# Patient Record
Sex: FEMALE | Race: WHITE | NOT HISPANIC OR LATINO | URBAN - NONMETROPOLITAN AREA
[De-identification: names, ages, dates, MRNs, and addresses within clinical notes are randomized per-mention and may not be internally consistent; named-entity substitution may affect disease eponyms.]

---
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
## 2021-04-23 ENCOUNTER — OFFICE VISIT (OUTPATIENT)
Dept: CARDIOLOGY | Facility: CLINIC | Age: 21
End: 2021-04-23

## 2021-04-23 VITALS
TEMPERATURE: 98.5 F | BODY MASS INDEX: 27.64 KG/M2 | HEART RATE: 73 BPM | WEIGHT: 146.4 LBS | SYSTOLIC BLOOD PRESSURE: 98 MMHG | DIASTOLIC BLOOD PRESSURE: 68 MMHG | HEIGHT: 61 IN

## 2021-04-23 DIAGNOSIS — R06.02 SHORTNESS OF BREATH: ICD-10-CM

## 2021-04-23 DIAGNOSIS — M79.605 PAIN IN BOTH LOWER EXTREMITIES: ICD-10-CM

## 2021-04-23 DIAGNOSIS — R01.1 HEART MURMUR: ICD-10-CM

## 2021-04-23 DIAGNOSIS — R00.2 PALPITATIONS: ICD-10-CM

## 2021-04-23 DIAGNOSIS — M79.604 PAIN IN BOTH LOWER EXTREMITIES: ICD-10-CM

## 2021-04-23 DIAGNOSIS — R60.0 EDEMA LEG: Primary | ICD-10-CM

## 2021-04-23 PROBLEM — I95.9 HYPOTENSION: Status: ACTIVE | Noted: 2021-04-23

## 2021-04-23 PROCEDURE — 93000 ELECTROCARDIOGRAM COMPLETE: CPT | Performed by: NURSE PRACTITIONER

## 2021-04-23 PROCEDURE — 99204 OFFICE O/P NEW MOD 45 MIN: CPT | Performed by: NURSE PRACTITIONER

## 2021-04-23 RX ORDER — PRENATAL VIT NO.126/IRON/FOLIC 28MG-0.8MG
TABLET ORAL DAILY
COMMUNITY

## 2021-04-23 RX ORDER — FLUOXETINE 10 MG/1
10 CAPSULE ORAL DAILY
COMMUNITY

## 2021-04-23 NOTE — PROGRESS NOTES
Subjective   Ivett Patrick is a 20 y.o. female     Chief Complaint   Patient presents with   • [facility]     Patient referred per Jac Webster for Hypotension and near syncope.   • Blood pressure     Noted with low B/P when she was 12 weeks pregnant 94/60, she was lightheaded, dizzy, felt hot, and having nausea. She is now 17 weeks pregnant has had 2 episodes of low B/P 96/64. Has noticed episodes more at work, feels could be related to not being able to eat adequately while at work, unable to snack.   • Edema     Having swelling in feet for the last couple of days. She is on feet a lot during the day.   • Rapid Heart Rate     At times she feels elevated heart rate, will check rate with pulse ox and notice 126. Reports normal rate for her is 60s.   • Shortness of Breath     Notices when doing laundry and dishes at home.     Initial cardiac evaluation;    HPI     Ms. Ivett Patrick is a [age] year old female who presents today for initial cardiac evaluation of hypotension and near syncope. She has no significant past medical history and no cardiac history. She is seventeen weeks pregnant with her first child. She has experienced palpitations on and off for many years described as an occasional skip or fluttering sensation not associated with chest pain, dizziness, or syncope. After she was about 12 weeks pregnant, she started noticing the palpitations occurring more frequently. She started monitoring the blood pressure and noted she was often mildly hypotensive, 94/60 and experienced light-headedness with this. She does not correlate palpitations with dizziness. She also has noticed increasing dyspnea and tachycardia with exertion. 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 According to her, OB visits have revealed no hypertension, no proteinuria. After having the low blood pressure, she increased sodium intake through  Gatorade or Slim Jims. For the last two weeks, she has had increasing generalized edema, hands and feet feel swollen. Legs are aching especially after working all day. She has gained 6-7 pounds from prepregnancy weight. She is a non-tobacco user. Drinks 1-2 caffeinated beverages daily. She has occasional tension-type headache. She has history of mild anxiety and depression treated with SSRI. Recently was started back on fluoxetine.  Labs done with OB on 3/27/21: TSH 0.672, normal CBC, H/H 12.4/37.3, platelets 208, glucose 86, BUN/Cr 8/0.47, Na/K 136/3.9, normal LFT, magnesium 2.0.     Cardiac History  Past Surgical History:   Procedure Laterality Date   • NO PAST SURGERIES       Current Outpatient Medications   Medication Sig Dispense Refill   • FLUoxetine (PROzac) 10 MG capsule Take 10 mg by mouth Daily.     • prenatal vitamin (prenatal, CLASSIC, vitamin) tablet Take  by mouth Daily.       No current facility-administered medications for this visit.     Amoxicillin and Penicillins    Past Medical History:   Diagnosis Date   • Anxiety    • Depression    • Healthy adolescent    • Hx of tonsillectomy 2008     Social History     Socioeconomic History   • Marital status: Single     Spouse name: Not on file   • Number of children: Not on file   • Years of education: Not on file   • Highest education level: Not on file   Tobacco Use   • Smoking status: Never Smoker   • Smokeless tobacco: Never Used   Vaping Use   • Vaping Use: Never used   Substance and Sexual Activity   • Alcohol use: No   • Drug use: Never   • Sexual activity: Never     Family History   Problem Relation Age of Onset   • No Known Problems Father    • Hypotension Mother    • Heart murmur Sister    • Hypertension Maternal Grandmother    • Clotting disorder Maternal Grandmother    • Cancer Maternal Grandfather    • Hypertension Maternal Grandfather    • Diabetes Paternal Grandmother    • Heart attack Paternal Grandfather         3 open heart surgery   •  "Stroke Paternal Grandfather    • Lung disease Paternal Grandfather    • Hypertension Paternal Grandfather      Review of Systems   Constitutional: Positive for fatigue.   Eyes: Negative.    Respiratory: Positive for shortness of breath. Negative for cough and chest tightness.    Cardiovascular: Positive for palpitations and leg swelling. Negative for chest pain.   Gastrointestinal: Negative.    Endocrine: Negative.    Genitourinary: Negative.    Musculoskeletal: Positive for neck stiffness.   Skin: Negative.    Allergic/Immunologic: Negative.    Neurological: Positive for dizziness, light-headedness and headaches.   Hematological: Negative.    Psychiatric/Behavioral: Negative.    All other systems reviewed and are negative.    Diabetes- No  Thyroid- normal    Objective     BP 98/68 (BP Location: Left arm)   Pulse 73   Temp 98.5 °F (36.9 °C)   Ht 154.9 cm (61\")   Wt 66.4 kg (146 lb 6.4 oz)   BMI 27.66 kg/m²     Physical Exam  Vitals and nursing note reviewed.   Constitutional:       General: She is not in acute distress.     Appearance: Normal appearance. She is normal weight. She is not ill-appearing or diaphoretic.   HENT:      Head: Normocephalic and atraumatic.      Right Ear: External ear normal.      Left Ear: External ear normal.      Nose: Nose normal.      Mouth/Throat:      Mouth: Mucous membranes are moist.   Eyes:      General: No scleral icterus.     Extraocular Movements: Extraocular movements intact.      Pupils: Pupils are equal, round, and reactive to light.   Neck:      Vascular: No carotid bruit.   Cardiovascular:      Rate and Rhythm: Normal rate and regular rhythm.  No extrasystoles are present.     Pulses: Normal pulses.      Heart sounds: Murmur heard.   Systolic murmur is present with a grade of 2/6.     Pulmonary:      Effort: Pulmonary effort is normal. No respiratory distress.      Breath sounds: Normal breath sounds.   Abdominal:      General: There is no distension.      Comments: " Patient is 17 weeks pregnant    Musculoskeletal:         General: Swelling (trace edema to LE bilat, hands bilat) present. No tenderness.      Cervical back: Normal range of motion.      Right lower leg: Edema (trace ) present.      Left lower leg: Edema (trace ) present.   Skin:     General: Skin is warm and dry.      Capillary Refill: Capillary refill takes less than 2 seconds.      Coloration: Skin is not pale.   Neurological:      General: No focal deficit present.      Mental Status: She is oriented to person, place, and time. Mental status is at baseline.      Coordination: Coordination normal.   Psychiatric:         Mood and Affect: Mood normal.         Behavior: Behavior normal.         Thought Content: Thought content normal.         Judgment: Judgment normal.         ECG 12 Lead    Date/Time: 4/23/2021 9:51 AM  Performed by: Jadyn Austin APRN  Authorized by: Jadyn Austin APRN   Comparison: not compared with previous ECG   Previous ECG: no previous ECG available  Rhythm: sinus rhythm  Rate: normal  BPM: 73  ST Segments: ST segments normal    Clinical impression: normal ECG  Comments:  ms  QRS 82 ms  QTc 390 ms           Assessment/Plan     Diagnoses and all orders for this visit:    1. Edema leg (Primary)  -     Adult Transthoracic Echo Complete W/ Cont if Necessary Per Protocol; Future  -     BNP; Future  -     Sedimentation Rate; Future    2. Shortness of breath  -     Adult Transthoracic Echo Complete W/ Cont if Necessary Per Protocol; Future  -     BNP; Future  -     TSH; Future    3. Heart murmur  -     Adult Transthoracic Echo Complete W/ Cont if Necessary Per Protocol; Future    4. Pain in both lower extremities  -     Sedimentation Rate; Future    5. Palpitations  -     Sedimentation Rate; Future  -     TSH; Future  -     Holter Monitor - 72 Hour Up To 15 Days; Future       Cardiac exam reveals normal first and second heart sound, 1-2/6 systolic murmur noted at the mid right sternal border.  Lungs CTA. Trace peripheral edema noted, LE and hands bilaterally. EKG is normal.     Hypotension- blood pressure lower limit of normal at 98/68. Continue adequate fluid intake, water, electrolyte drinks are acceptable but need to limit daily sodium intake to around 3299-7865 mg. Regular protein/nutrition intake to prevent hypoglycemia.     Edema- Limit na intake. Heart healthy diet. Regular walking. Avoid prolong sitting or standing. Use of compression socks may be helpful. Will check BNP, sed rate. Recheck CMP, CBC, TSH.     Heart murmur- echocardiogram to evaluate mitral, aortic, and tricuspid valves, LVEF, PA pressure. May be flow murmur secondary to the pregnancy. Will order bubble study to rule out PFO with history of headache and dizziness.     Palpitations will be evaluated with 7 day holter monitor. She may need low dose labetalol if there is significant arrhythmia.     At this time, I did not start any medication. Will try to manage her conservatively. Further recommendations to follow. We will see her back in 3-4 months before she delivers. Reassurance was given.

## 2021-05-19 ENCOUNTER — LAB (OUTPATIENT)
Dept: LAB | Facility: HOSPITAL | Age: 21
End: 2021-05-19

## 2021-05-19 ENCOUNTER — HOSPITAL ENCOUNTER (OUTPATIENT)
Dept: CARDIOLOGY | Facility: HOSPITAL | Age: 21
Discharge: HOME | End: 2021-05-19

## 2021-05-19 VITALS — HEIGHT: 61 IN | BODY MASS INDEX: 27.64 KG/M2 | WEIGHT: 146.39 LBS

## 2021-05-19 DIAGNOSIS — M79.604 PAIN IN BOTH LOWER EXTREMITIES: ICD-10-CM

## 2021-05-19 DIAGNOSIS — R60.0 EDEMA LEG: ICD-10-CM

## 2021-05-19 DIAGNOSIS — M79.605 PAIN IN BOTH LOWER EXTREMITIES: ICD-10-CM

## 2021-05-19 DIAGNOSIS — R00.2 PALPITATIONS: ICD-10-CM

## 2021-05-19 DIAGNOSIS — R01.1 HEART MURMUR: ICD-10-CM

## 2021-05-19 DIAGNOSIS — R06.02 SHORTNESS OF BREATH: ICD-10-CM

## 2021-05-19 LAB
AORTIC DIMENSIONLESS INDEX: 0.9 (DI)
BH CV ECHO MEAS - AO MAX PG (FULL): 1.9 MMHG
BH CV ECHO MEAS - AO MAX PG: 8.5 MMHG
BH CV ECHO MEAS - AO MEAN PG (FULL): 1 MMHG
BH CV ECHO MEAS - AO MEAN PG: 4 MMHG
BH CV ECHO MEAS - AO V2 MAX: 146 CM/SEC
BH CV ECHO MEAS - AO V2 MEAN: 89.4 CM/SEC
BH CV ECHO MEAS - AO V2 VTI: 29.9 CM
BH CV ECHO MEAS - BSA(HAYCOCK): 1.7 M^2
BH CV ECHO MEAS - BSA: 1.7 M^2
BH CV ECHO MEAS - BZI_BMI: 27.6 KILOGRAMS/M^2
BH CV ECHO MEAS - BZI_METRIC_HEIGHT: 154.9 CM
BH CV ECHO MEAS - BZI_METRIC_WEIGHT: 66.2 KG
BH CV ECHO MEAS - EDV(CUBED): 95.4 ML
BH CV ECHO MEAS - EDV(TEICH): 95.9 ML
BH CV ECHO MEAS - EF(CUBED): 65.3 %
BH CV ECHO MEAS - EF(TEICH): 56.9 %
BH CV ECHO MEAS - ESV(CUBED): 33.1 ML
BH CV ECHO MEAS - ESV(TEICH): 41.3 ML
BH CV ECHO MEAS - FS: 29.8 %
BH CV ECHO MEAS - IVS/LVPW: 0.96
BH CV ECHO MEAS - IVSD: 0.74 CM
BH CV ECHO MEAS - LA DIMENSION: 3.2 CM
BH CV ECHO MEAS - LAT PEAK E' VEL: 19.6 CM/SEC
BH CV ECHO MEAS - LV IVRT: 0.09 SEC
BH CV ECHO MEAS - LV MASS(C)D: 108 GRAMS
BH CV ECHO MEAS - LV MASS(C)DI: 65.4 GRAMS/M^2
BH CV ECHO MEAS - LV MAX PG: 6.7 MMHG
BH CV ECHO MEAS - LV MEAN PG: 3 MMHG
BH CV ECHO MEAS - LV V1 MAX: 129 CM/SEC
BH CV ECHO MEAS - LV V1 MEAN: 80.4 CM/SEC
BH CV ECHO MEAS - LV V1 VTI: 25.8 CM
BH CV ECHO MEAS - LVIDD: 4.6 CM
BH CV ECHO MEAS - LVIDS: 3.2 CM
BH CV ECHO MEAS - LVPWD: 0.77 CM
BH CV ECHO MEAS - MED PEAK E' VEL: 12.5 CM/SEC
BH CV ECHO MEAS - MV A MAX VEL: 33.8 CM/SEC
BH CV ECHO MEAS - MV DEC SLOPE: 456 CM/SEC^2
BH CV ECHO MEAS - MV DEC TIME: 0.35 SEC
BH CV ECHO MEAS - MV E MAX VEL: 99.4 CM/SEC
BH CV ECHO MEAS - MV E/A: 2.9
BH CV ECHO MEAS - MV MAX PG: 6.3 MMHG
BH CV ECHO MEAS - MV MEAN PG: 2 MMHG
BH CV ECHO MEAS - MV P1/2T MAX VEL: 135 CM/SEC
BH CV ECHO MEAS - MV P1/2T: 86.7 MSEC
BH CV ECHO MEAS - MV V2 MAX: 125 CM/SEC
BH CV ECHO MEAS - MV V2 MEAN: 68 CM/SEC
BH CV ECHO MEAS - MV V2 VTI: 34.4 CM
BH CV ECHO MEAS - MVA P1/2T LCG: 1.6 CM^2
BH CV ECHO MEAS - MVA(P1/2T): 2.5 CM^2
BH CV ECHO MEAS - PA MAX PG (FULL): 1.6 MMHG
BH CV ECHO MEAS - PA MAX PG: 4.4 MMHG
BH CV ECHO MEAS - PA MEAN PG (FULL): 1.5 MMHG
BH CV ECHO MEAS - PA MEAN PG: 2.5 MMHG
BH CV ECHO MEAS - PA V2 MAX: 104.1 CM/SEC
BH CV ECHO MEAS - PA V2 MEAN: 67.3 CM/SEC
BH CV ECHO MEAS - PA V2 VTI: 24.1 CM
BH CV ECHO MEAS - RAP SYSTOLE: 10 MMHG
BH CV ECHO MEAS - RV MAX PG: 2.8 MMHG
BH CV ECHO MEAS - RV MEAN PG: 1 MMHG
BH CV ECHO MEAS - RV V1 MAX: 83.6 CM/SEC
BH CV ECHO MEAS - RV V1 MEAN: 52.1 CM/SEC
BH CV ECHO MEAS - RV V1 VTI: 20.2 CM
BH CV ECHO MEAS - RVDD: 2.3 CM
BH CV ECHO MEAS - RVSP: 18 MMHG
BH CV ECHO MEAS - SI(CUBED): 37.7 ML/M^2
BH CV ECHO MEAS - SI(TEICH): 33 ML/M^2
BH CV ECHO MEAS - SV(CUBED): 62.4 ML
BH CV ECHO MEAS - SV(TEICH): 54.6 ML
BH CV ECHO MEAS - TR MAX VEL: 133 CM/SEC
BH CV ECHO MEASUREMENTS AVERAGE E/E' RATIO: 6.19
ERYTHROCYTE [SEDIMENTATION RATE] IN BLOOD: 17 MM/HR (ref 0–20)
MAXIMAL PREDICTED HEART RATE: 200 BPM
NT-PROBNP SERPL-MCNC: 58.5 PG/ML (ref 0–450)
STRESS TARGET HR: 170 BPM
TSH SERPL DL<=0.05 MIU/L-ACNC: 0.75 UIU/ML (ref 0.27–4.2)

## 2021-05-19 PROCEDURE — 93306 TTE W/DOPPLER COMPLETE: CPT

## 2021-05-19 PROCEDURE — 36415 COLL VENOUS BLD VENIPUNCTURE: CPT

## 2021-05-19 PROCEDURE — 85652 RBC SED RATE AUTOMATED: CPT

## 2021-05-19 PROCEDURE — 83880 ASSAY OF NATRIURETIC PEPTIDE: CPT

## 2021-05-19 PROCEDURE — 84443 ASSAY THYROID STIM HORMONE: CPT

## 2021-05-19 PROCEDURE — 93306 TTE W/DOPPLER COMPLETE: CPT | Performed by: INTERNAL MEDICINE

## 2021-05-19 RX ORDER — SODIUM CHLORIDE 9 MG/ML
8 INJECTION INTRAMUSCULAR; INTRAVENOUS; SUBCUTANEOUS AS NEEDED
Status: DISCONTINUED | OUTPATIENT
Start: 2021-05-19 | End: 2021-05-20 | Stop reason: HOSPADM

## 2021-05-19 RX ADMIN — SODIUM CHLORIDE 8 ML: 9 INJECTION, SOLUTION INTRAMUSCULAR; INTRAVENOUS; SUBCUTANEOUS at 15:16

## 2023-11-08 ENCOUNTER — CLINICAL SUPPORT (OUTPATIENT)
Dept: FAMILY MEDICINE CLINIC | Facility: CLINIC | Age: 23
End: 2023-11-08
Payer: COMMERCIAL

## 2023-11-08 DIAGNOSIS — Z23 FLU VACCINE NEED: Primary | ICD-10-CM

## 2023-11-08 PROCEDURE — 90686 IIV4 VACC NO PRSV 0.5 ML IM: CPT | Performed by: FAMILY MEDICINE

## 2023-11-08 PROCEDURE — 90471 IMMUNIZATION ADMIN: CPT | Performed by: FAMILY MEDICINE

## 2023-11-28 ENCOUNTER — CLINICAL SUPPORT (OUTPATIENT)
Dept: FAMILY MEDICINE CLINIC | Facility: CLINIC | Age: 23
End: 2023-11-28
Payer: COMMERCIAL

## 2023-11-28 VITALS
HEART RATE: 100 BPM | DIASTOLIC BLOOD PRESSURE: 82 MMHG | SYSTOLIC BLOOD PRESSURE: 100 MMHG | TEMPERATURE: 97.9 F | OXYGEN SATURATION: 99 %

## 2023-11-28 DIAGNOSIS — R42 LIGHTHEADED: Primary | ICD-10-CM

## 2023-11-28 DIAGNOSIS — R11.0 NAUSEA: ICD-10-CM

## 2023-11-28 LAB — GLUCOSE BLDC GLUCOMTR-MCNC: 74 MG/DL (ref 70–130)

## 2023-11-28 PROCEDURE — 96372 THER/PROPH/DIAG INJ SC/IM: CPT | Performed by: FAMILY MEDICINE

## 2023-11-28 PROCEDURE — 82948 REAGENT STRIP/BLOOD GLUCOSE: CPT | Performed by: FAMILY MEDICINE

## 2023-11-28 RX ORDER — ONDANSETRON 2 MG/ML
4 INJECTION INTRAMUSCULAR; INTRAVENOUS EVERY 6 HOURS PRN
Status: DISCONTINUED | OUTPATIENT
Start: 2023-11-28 | End: 2023-11-28

## 2023-11-28 RX ORDER — ONDANSETRON 2 MG/ML
4 INJECTION INTRAMUSCULAR; INTRAVENOUS ONCE
Status: COMPLETED | OUTPATIENT
Start: 2023-11-28 | End: 2023-11-28

## 2023-11-28 RX ADMIN — ONDANSETRON 4 MG: 2 INJECTION INTRAMUSCULAR; INTRAVENOUS at 10:12

## 2023-12-29 DIAGNOSIS — K21.9 GASTROESOPHAGEAL REFLUX DISEASE WITHOUT ESOPHAGITIS: Primary | ICD-10-CM

## 2023-12-29 RX ORDER — PANTOPRAZOLE SODIUM 40 MG/1
40 TABLET, DELAYED RELEASE ORAL 2 TIMES DAILY
Qty: 60 TABLET | Refills: 0 | Status: SHIPPED | OUTPATIENT
Start: 2023-12-29

## 2024-05-13 ENCOUNTER — OFFICE VISIT (OUTPATIENT)
Age: 24
End: 2024-05-13
Payer: COMMERCIAL

## 2024-05-13 VITALS
SYSTOLIC BLOOD PRESSURE: 123 MMHG | OXYGEN SATURATION: 98 % | BODY MASS INDEX: 30.89 KG/M2 | HEIGHT: 61 IN | DIASTOLIC BLOOD PRESSURE: 81 MMHG | WEIGHT: 163.6 LBS | HEART RATE: 92 BPM

## 2024-05-13 DIAGNOSIS — F90.0 ADHD (ATTENTION DEFICIT HYPERACTIVITY DISORDER), INATTENTIVE TYPE: Primary | ICD-10-CM

## 2024-05-13 DIAGNOSIS — F41.1 GENERALIZED ANXIETY DISORDER: ICD-10-CM

## 2024-05-13 RX ORDER — DROSPIRENONE AND ESTETROL 3-14.2(28)
3-14.2 KIT ORAL DAILY
COMMUNITY

## 2024-05-13 RX ORDER — DEXTROAMPHETAMINE SACCHARATE, AMPHETAMINE ASPARTATE, DEXTROAMPHETAMINE SULFATE AND AMPHETAMINE SULFATE 2.5; 2.5; 2.5; 2.5 MG/1; MG/1; MG/1; MG/1
10 TABLET ORAL 2 TIMES DAILY
Qty: 60 TABLET | Refills: 0 | Status: SHIPPED | OUTPATIENT
Start: 2024-05-13
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
## 2024-05-13 NOTE — PATIENT INSTRUCTIONS
Www.[email or web address]    Book recommendations  Unf**k Your Brain, Lavern Pedersen  Try Softer, Ishmael Cole  No Bad Parts, Kvng Sutton  A Radical Guide for Women with ADHD  Adult Children of Emotionally Immature Parents    Britta recommendations:  I am: affirmations  Benson: free library access apps, including audiobooks  Down Dog: free yoga

## 2024-05-15 PROBLEM — F41.1 GENERALIZED ANXIETY DISORDER: Status: ACTIVE | Noted: 2024-05-15

## 2024-05-21 ENCOUNTER — PRIOR AUTHORIZATION (OUTPATIENT)
Dept: BEHAVIORAL HEALTH | Facility: CLINIC | Age: 24
End: 2024-05-21
Payer: COMMERCIAL

## 2024-05-31 ENCOUNTER — DOCUMENTATION (OUTPATIENT)
Dept: FAMILY MEDICINE CLINIC | Facility: CLINIC | Age: 24
End: 2024-05-31
Payer: COMMERCIAL

## 2024-05-31 DIAGNOSIS — Z30.8 ENCOUNTER FOR OTHER CONTRACEPTIVE MANAGEMENT: Primary | ICD-10-CM

## 2024-05-31 DIAGNOSIS — Z30.8 ENCOUNTER FOR OTHER CONTRACEPTIVE MANAGEMENT: ICD-10-CM

## 2024-05-31 RX ORDER — DROSPIRENONE AND ESTETROL 3-14.2(28)
1 KIT ORAL DAILY
Qty: 84 TABLET | Refills: 1 | Status: SHIPPED | OUTPATIENT
Start: 2024-05-31

## 2024-05-31 RX ORDER — DROSPIRENONE AND ESTETROL 3-14.2(28)
1 KIT ORAL DAILY
Qty: 28 TABLET | Refills: 5 | Status: SHIPPED | OUTPATIENT
Start: 2024-05-31 | End: 2024-05-31 | Stop reason: SDUPTHER

## 2024-05-31 NOTE — PROGRESS NOTES
Patient is currently taking Nextstellis and she is out of refills and her women's health provider has moved locations. She is tolerating the birth control well; she doesn't smoke cigarettes or vape. She is planning to establish care for women's health with Karli Crook PA-C some time within the next few weeks. Nextstellis has been sent to the patient's pharmacy to prevent pregnancy. I have discussed indications and possible SEs with patient.

## 2024-06-05 ENCOUNTER — PATIENT ROUNDING (BHMG ONLY) (OUTPATIENT)
Dept: FAMILY MEDICINE CLINIC | Facility: CLINIC | Age: 24
End: 2024-06-05
Payer: COMMERCIAL

## 2024-06-05 ENCOUNTER — OFFICE VISIT (OUTPATIENT)
Dept: FAMILY MEDICINE CLINIC | Facility: CLINIC | Age: 24
End: 2024-06-05
Payer: COMMERCIAL

## 2024-06-05 VITALS
WEIGHT: 163 LBS | HEART RATE: 91 BPM | HEIGHT: 61 IN | SYSTOLIC BLOOD PRESSURE: 118 MMHG | OXYGEN SATURATION: 99 % | TEMPERATURE: 98.4 F | BODY MASS INDEX: 30.78 KG/M2 | RESPIRATION RATE: 20 BRPM | DIASTOLIC BLOOD PRESSURE: 80 MMHG

## 2024-06-05 DIAGNOSIS — Z12.4 CERVICAL CANCER SCREENING: ICD-10-CM

## 2024-06-05 DIAGNOSIS — N92.1 MENORRHAGIA WITH IRREGULAR CYCLE: ICD-10-CM

## 2024-06-05 DIAGNOSIS — Z30.8 ENCOUNTER FOR OTHER CONTRACEPTIVE MANAGEMENT: Primary | ICD-10-CM

## 2024-06-05 DIAGNOSIS — Z00.00 ENCOUNTER FOR MEDICAL EXAMINATION TO ESTABLISH CARE: ICD-10-CM

## 2024-06-05 LAB
B-HCG UR QL: NEGATIVE
EXPIRATION DATE: NORMAL
INTERNAL NEGATIVE CONTROL: NEGATIVE
INTERNAL POSITIVE CONTROL: POSITIVE
Lab: NORMAL

## 2024-06-05 RX ORDER — DROSPIRENONE AND ESTETROL 3-14.2(28)
1 KIT ORAL DAILY
Qty: 84 TABLET | Refills: 3 | Status: SHIPPED | OUTPATIENT
Start: 2024-06-05

## 2024-06-05 NOTE — PROGRESS NOTES
Office Note     Name: Ivett Patrick    : 2000     MRN: [id number]     Chief Complaint  Establish Care    History of Present Illness:  Ivett Patrick is a 23 y.o. female who presents today for establishment of care. Previous PCP was Ana Maria Chilel and Dolly Barragan. She does see behavioral health for ADHD, anxiety, and depression.  She reports her ADHD and anxiety and depression symptoms are well-controlled on current medications.    She reports that her last Pap smear was 7 months ago.  She does report history of abnormal Paps and positive HPV in the past.  She reports she had a colposcopy over 1 year ago.  She is planning to establish with new gynecologist in the area soon.  She reports that she had wellness labs completed within the past year.    She is currently using Nextstellis for birth control, which she reports has been working very well.  She has been on this birth control for around 1 year.  She reports that she has been intolerant to other birth controls in the past.  She has tried Depo shot, contraceptive patch, NuvaRing, and other OCPs.  She reports that her cycles could never get regulated on these birth controls.  She reports having very heavy and irregular bleeding with these other forms of birth control.  She reports very heavy and irregular bleeding off of birth control.  With her current form of birth control, it is controlling her cycles well, usually has cycle for 8 days.  She denies any side effects.  Last menstrual cycle was last Saturday.       Subjective     Review of Systems:   Review of Systems   Constitutional:  Negative for chills, fatigue, fever and unexpected weight loss.   HENT:  Negative for trouble swallowing and voice change.    Eyes:  Negative for blurred vision and double vision.   Respiratory:  Negative for cough, shortness of breath and wheezing.    Cardiovascular:  Negative for chest pain, palpitations and leg swelling.   Gastrointestinal:  Negative for abdominal pain,  blood in stool, constipation, diarrhea, nausea and vomiting.   Genitourinary:  Negative for dysuria, hematuria, vaginal discharge and vaginal pain.   Skin:  Negative for rash.   Neurological:  Negative for dizziness, syncope, weakness, light-headedness and headache.       I have reviewed the patients family history, social history, past medical history, past surgical history and have updated it as appropriate.     Past Medical History:   Past Medical History:   Diagnosis Date    ADHD (attention deficit hyperactivity disorder)     Anxiety     Depression     Healthy adolescent     Hx of tonsillectomy 2008       Past Surgical History:   Past Surgical History:   Procedure Laterality Date    CONVERTED (HISTORICAL) HOLTER  05/17/2021    <7 Days. AVG 82. . Rare PAC & PVC    ECHO - CONVERTED  05/19/2021    EF 65%. Trace  RVSP- 18 mmHg. No shunt    TONSILLECTOMY  2008       Family History:   Family History   Problem Relation Age of Onset    Hypotension Mother     Fibromyalgia Mother     No Known Problems Father     Fibromyalgia Sister     Heart murmur Sister     Celiac disease Sister     Hypertension Maternal Grandmother     Clotting disorder Maternal Grandmother     Hypertension Maternal Grandfather     Lung cancer Maternal Grandfather     Diabetes Paternal Grandmother     Heart attack Paternal Grandfather         3 open heart surgery    Stroke Paternal Grandfather     Lung disease Paternal Grandfather     Hypertension Paternal Grandfather        Social History:   Social History     Socioeconomic History    Marital status: Single   Tobacco Use    Smoking status: Never    Smokeless tobacco: Never   Vaping Use    Vaping status: Never Used   Substance and Sexual Activity    Alcohol use: No    Drug use: Never    Sexual activity: Yes     Partners: Male     Birth control/protection: Birth control pill       Immunizations:   Immunization History   Administered Date(s) Administered    COVID-19 (MODERNA) 1st,2nd,3rd Dose  "Monovalent 10/21/2021    DTaP, Unspecified 2000, 02/22/2001, 07/17/2001, 01/29/2002, 05/24/2005    Fluzone (or Fluarix & Flulaval for VFC) >6mos 10/28/2014, 09/29/2015, 09/20/2017, 11/08/2023    Hep B / HiB 2000, 01/29/2002    Hep B, Adolescent or Pediatric 2000, 2000, 01/29/2002    Hib (PRP-OMP) 02/22/2001    Hpv9 03/30/2023, 05/12/2023, 09/28/2023    IPV 2000, 02/22/2001, 01/29/2002, 05/24/2005    MMR 01/29/2002, 05/24/2005    MMRV 07/26/2023    Meningococcal, Unspecified 08/13/2012    PEDS-Pneumococcal Conjugate (PCV7) 2000    Tdap 08/13/2012, 10/29/2018    Varicella 05/27/2002, 08/27/2002, 08/13/2012        Medications:     Current Outpatient Medications:     Drospirenone-Estetrol (Nextstellis) 3-14.2 MG tablet, Take 1 tablet by mouth Daily., Disp: 84 tablet, Rfl: 3    amphetamine-dextroamphetamine (Adderall) 10 MG tablet, Take 1 tablet by mouth 2 (Two) Times a Day., Disp: 60 tablet, Rfl: 0    Levomilnacipran HCl ER (Fetzima) 40 MG capsule sustained-release 24 hr, Take 1 capsule by mouth Daily., Disp: 90 capsule, Rfl: 0    Allergies:   Allergies   Allergen Reactions    Amoxicillin Swelling     rash    Penicillins Swelling     Rash         Objective     Vital Signs  Vitals:    06/05/24 0825   BP: 118/80   BP Location: Right arm   Patient Position: Sitting   Cuff Size: Adult   Pulse: 91   Resp: 20   Temp: 98.4 °F (36.9 °C)   TempSrc: Temporal   SpO2: 99%   Weight: 73.9 kg (163 lb)   Height: 154 cm (60.63\")   PainSc: 0-No pain     Estimated body mass index is 31.18 kg/m² as calculated from the following:    Height as of this encounter: 154 cm (60.63\").    Weight as of this encounter: 73.9 kg (163 lb).          Physical Exam  Vitals and nursing note reviewed.   Constitutional:       General: She is not in acute distress.     Appearance: Normal appearance. She is not ill-appearing, toxic-appearing or diaphoretic.   HENT:      Head: Normocephalic and atraumatic.   Eyes:      " Extraocular Movements: Extraocular movements intact.   Neck:      Comments: No thyromegaly or nodules appreciated or palpated  Cardiovascular:      Rate and Rhythm: Normal rate and regular rhythm.      Heart sounds: No murmur heard.     No friction rub. No gallop.   Pulmonary:      Effort: Pulmonary effort is normal. No respiratory distress.      Breath sounds: No wheezing, rhonchi or rales.   Musculoskeletal:      Cervical back: Normal range of motion.   Skin:     Coloration: Skin is not pale.   Neurological:      Mental Status: She is alert and oriented to person, place, and time. Mental status is at baseline.   Psychiatric:         Mood and Affect: Mood normal.         Thought Content: Thought content normal.          Assessment and Plan     1. Encounter for other contraceptive management  -We did discuss multiple options for birth control: Multiple OCPs, NuvaRing, Nexplanon, IUD, patch.  We discussed benefits and possible risks of each option.  Because she is doing well with her current birth control, I would suggest that we continue with current regimen.  -Did discuss proper way to take medication.  Discussed that oral antibiotic can make birth control ineffective and she should use backup form of contraception if on antibiotic.  - Drospirenone-Estetrol (Nextstellis) 3-14.2 MG tablet; Take 1 tablet by mouth Daily.  Dispense: 84 tablet; Refill: 3    2. Menorrhagia with irregular cycle  -Current form of birth control is controlling her menorrhagia and irregular cycles very well while other forms of contraception in the past have been ineffective in helping the symptoms.  I would suggest no changes and continue with current regimen.  - Drospirenone-Estetrol (Nextstellis) 3-14.2 MG tablet; Take 1 tablet by mouth Daily.  Dispense: 84 tablet; Refill: 3    3. Cervical cancer screening  -With her history of irregular Pap smears and being HPV positive, have recommended establishment of care with new gynecologist, as her  previous gynecologist just left her previous practice for regular follow up and screening.  Patient does verbalize plan to establish with new gynecologist in the area very soon.  She denies need for referral.    4. Encounter for medical examination to establish care  -PHQ-2 Total Score: 0  -She reports she has had screening wellness labs within the past year.  Review of systems is negative.  Would recommend annual physical in 1 month.       Follow Up  Return in about 1 month (around 7/5/2024) for Annual physical.    Patty Crook PA-C  [facility] MICHAEL Brock

## 2024-06-05 NOTE — PROGRESS NOTES
A Chalkable message has been sent to the patient for PATIENT  ROUNDING with [facility]

## 2024-06-12 ENCOUNTER — TELEMEDICINE (OUTPATIENT)
Age: 24
End: 2024-06-12
Payer: COMMERCIAL

## 2024-06-12 DIAGNOSIS — F41.1 GENERALIZED ANXIETY DISORDER: Primary | ICD-10-CM

## 2024-06-12 DIAGNOSIS — F90.0 ADHD (ATTENTION DEFICIT HYPERACTIVITY DISORDER), INATTENTIVE TYPE: ICD-10-CM

## 2024-06-12 RX ORDER — DEXTROAMPHETAMINE SACCHARATE, AMPHETAMINE ASPARTATE MONOHYDRATE, DEXTROAMPHETAMINE SULFATE AND AMPHETAMINE SULFATE 3.75; 3.75; 3.75; 3.75 MG/1; MG/1; MG/1; MG/1
15 CAPSULE, EXTENDED RELEASE ORAL DAILY
Qty: 30 CAPSULE | Refills: 0 | Status: SHIPPED | OUTPATIENT
Start: 2024-06-12

## 2024-06-12 NOTE — PROGRESS NOTES
"     Telehealth E-Visit      Patient Name: Ivett Patrick  : 2000   MRN: [id number]     Chief Complaint:      ICD-10-CM ICD-9-CM   1. Generalized anxiety disorder  F41.1 300.02   2. ADHD (attention deficit hyperactivity disorder), inattentive type  F90.0 314.00        I have reviewed the E-Visit questionnaire and the patient's answers, my assessment and plan are listed below.     This provider is located at the [facility] (through [facility]), [address] [place] [address] using a secure xzoops Video Visit through [employer]. Patient is being seen remotely via telehealth at their home address in [place], and stated they are in a secure environment for this session. The patient's condition being diagnosed/treated is appropriate for telemedicine. The provider identified herself as well as her credentials. The patient, and/or patients guardian, consent to be seen remotely, and when consent is given they understand that the consent allows for patient identifiable information to be sent to a third party as needed. They may refuse to be seen remotely at any time. The electronic data is encrypted and password protected, and the patient and/or guardian has been advised of the potential risks to privacy not withstanding such measures.    You have chosen to receive care through a telehealth visit. Do you consent to use a video/audio connection for your medical care today? Yes    History of Present Illness: Ivett Patrick is a 23 y.o. female who is here today to follow up with medication management for anxiety and ADHD.  She is having an emotional day, having to spend the past 2 days with her sick toddler, including hospital time.  At her last visit, she started Adderall 10 mg twice daily.  She reports that she does notice some effect, but notes that does not last all day.  She states, \"I feel a little more focused, like it is easier to do what I need to do.\"  She did have transient " headaches the first two weeks on the medication, but they have since resolved.  Since the last visit, she has increased her Fetzima dose to 80 mg, and says that her anxiety has improved.  No SI/HI/psychotic/manic symptoms present, no adverse effects or side effects to current medications noted, and no issues with appetite or sleep reported.    Subjective      I have reviewed and the following portions of the patient's history were updated as appropriate: past family history, past medical history, past social history, past surgical history and problem list.    Medications:     Current Outpatient Medications:     amphetamine-dextroamphetamine XR (Adderall XR) 15 MG 24 hr capsule, Take 1 capsule by mouth Daily, Disp: 30 capsule, Rfl: 0    Drospirenone-Estetrol (Nextstellis) 3-14.2 MG tablet, Take 1 tablet by mouth Daily., Disp: 84 tablet, Rfl: 3    Levomilnacipran HCl ER 80 MG capsule sustained-release 24 hr, Take 80 mg by mouth Daily., Disp: 30 capsule, Rfl: 2    Allergies:   Allergies   Allergen Reactions    Amoxicillin Swelling     rash    Penicillins Swelling     Rash         Objective     Physical Exam:  Physical Exam  Psychiatric:         Attention and Perception: She is inattentive.         Mood and Affect: Affect normal. Mood is anxious.         Speech: Speech normal.         Behavior: Behavior normal.         Thought Content: Thought content normal.         Cognition and Memory: Cognition normal.         Judgment: Judgment normal.         Mental Status Exam:  MENTAL STATUS EXAM   General Appearance:  Cleanly groomed and dressed  Eye Contact:  Good eye contact  Attitude:  Cooperative  Motor Activity:  Normal gait, posture  Muscle Strength:  Normal  Speech:  Normal rate, tone, volume  Language:  Spontaneous  Mood and affect:  Normal, pleasant  Hopelessness:  Denies  Loneliness: Denies  Thought Process:  Logical and goal-directed  Associations/ Thought Content:  No delusions  Hallucinations:  None  Suicidal  Ideations:  Not present  Homicidal Ideation:  Not present  Sensorium:  Alert and clear  Orientation:  Person, place, time and situation  Immediate Recall, Recent, and Remote Memory:  Intact  Attention Span/ Concentration:  Easily distracted  Fund of Knowledge:  Appropriate for age and educational level  Intellectual Functioning:  Average range  Insight:  Good  Judgement:  Good  Reliability:  Good  Impulse Control:  Good      Assessment / Plan    Quality Measures:  Tobacco Cessation: Patient denies tobacco use. No tobacco cessation education necessary.    Depression (PHQ >9): Patient screened negative this visit with a PHQ score < 9. Will continue to monitor screening scores and provide supportive care.    Medication education:   Benzo: n/a  Stimulants: CSA up to date and on file     Safety: No acute safety concerns    Risk Assessment: Risk of self-harm acutely is low. Risk of self-harm chronically is also low, but could be further elevated in the event of treatment noncompliance and/or AODA.    15 minutes were spent reviewing the patient's questionnaire, formulating a treatment plan, and relaying information to the patient via [employer].    Assessment/Plan:   Diagnoses and all orders for this visit:    1. Generalized anxiety disorder (Primary)  -     Levomilnacipran HCl ER 80 MG capsule sustained-release 24 hr; Take 80 mg by mouth Daily.  Dispense: 30 capsule; Refill: 2    2. ADHD (attention deficit hyperactivity disorder), inattentive type  -     amphetamine-dextroamphetamine XR (Adderall XR) 15 MG 24 hr capsule; Take 1 capsule by mouth Daily  Dispense: 30 capsule; Refill: 0         Impression/Formulation:  Patient appeared alert and oriented.  Patient is voluntarily contiuing psychiatric care at [facility].  Patient is receptive to assistance with maintaining a stable lifestyle.  Patient presents with history of     ICD-10-CM ICD-9-CM   1. Generalized anxiety disorder  F41.1 300.02   2. ADHD  (attention deficit hyperactivity disorder), inattentive type  F90.0 314.00   .     Treatment Plan:   Continue 80 mg of Fetzima daily.  Transition to Adderall XR 15 mg daily.  Follow-up in 4 weeks.    Any medications prescribed have been sent electronically to [facility] in [place].     Patient will continue supportive psychotherapy efforts and medications as indicated. Discussed medication options and treatment plan of prescribed medication(s) as well as the risks, benefits, and potential side effects. Patient ackowledged and verbally consented to continue with current treatment plan and was educated on the importance of compliance with treatment and follow-up appointments. Patient seems reasonably able to adhere to treatment plan.      Assisted Patient in identifying risk factors which would indicate the need for higher level of care including thoughts to harm self or others and/or self-harming behavior and encouraged Patient to contact this office, call 911, or present to the nearest emergency room should any of these events occur. Discussed crisis intervention services and means to access. Clinic will obtain release of information for current treatment team for continuity of care as needed. Patient adamantly and convincingly denies current suicidal or homicidal ideation or perceptual disturbance.       Follow Up:   Return in about 4 weeks (around 7/10/2024) for Medication Management.        JAVIER Do  [facility]    This is electronically signed by JAVIER Do  06/12/2024 16:47 EDT

## 2024-06-13 ENCOUNTER — PRIOR AUTHORIZATION (OUTPATIENT)
Dept: BEHAVIORAL HEALTH | Facility: CLINIC | Age: 24
End: 2024-06-13
Payer: COMMERCIAL

## 2024-06-13 NOTE — TELEPHONE ENCOUNTER
Prior authorization for Amphetamine-Dextroamphet ER 15MG er capsules approved by insurance. (Key: [id number])

## 2024-06-26 ENCOUNTER — TELEPHONE (OUTPATIENT)
Dept: FAMILY MEDICINE CLINIC | Facility: CLINIC | Age: 24
End: 2024-06-26
Payer: COMMERCIAL

## 2024-06-26 NOTE — TELEPHONE ENCOUNTER
PATIENT REQUESTED INFORMATION ON MEDICATION, ALBERSTELLIS, ADVISED PATIENT A PRESCRIPTION FOR 84 TABLETS  FOR A 3 MONTH SUPPLY WITH 3 REFILLS HAS BEEN SENT TO PHARMACY.PATIENT VERBALIZED UNDERSTANDING

## 2024-07-15 ENCOUNTER — OFFICE VISIT (OUTPATIENT)
Age: 24
End: 2024-07-15
Payer: COMMERCIAL

## 2024-07-15 VITALS
WEIGHT: 156 LBS | SYSTOLIC BLOOD PRESSURE: 116 MMHG | DIASTOLIC BLOOD PRESSURE: 75 MMHG | HEIGHT: 61 IN | BODY MASS INDEX: 29.45 KG/M2 | OXYGEN SATURATION: 100 % | HEART RATE: 97 BPM

## 2024-07-15 DIAGNOSIS — F90.0 ADHD (ATTENTION DEFICIT HYPERACTIVITY DISORDER), INATTENTIVE TYPE: ICD-10-CM

## 2024-07-15 DIAGNOSIS — F41.1 GENERALIZED ANXIETY DISORDER: Primary | ICD-10-CM

## 2024-07-15 PROCEDURE — 99214 OFFICE O/P EST MOD 30 MIN: CPT

## 2024-07-15 RX ORDER — DEXTROAMPHETAMINE SACCHARATE, AMPHETAMINE ASPARTATE MONOHYDRATE, DEXTROAMPHETAMINE SULFATE AND AMPHETAMINE SULFATE 3.75; 3.75; 3.75; 3.75 MG/1; MG/1; MG/1; MG/1
15 CAPSULE, EXTENDED RELEASE ORAL DAILY
Qty: 30 CAPSULE | Refills: 0 | Status: SHIPPED | OUTPATIENT
Start: 2024-07-15

## 2024-07-15 RX ORDER — BUSPIRONE HYDROCHLORIDE 10 MG/1
10 TABLET ORAL 2 TIMES DAILY
Qty: 60 TABLET | Refills: 2 | Status: SHIPPED | OUTPATIENT
Start: 2024-07-15

## 2024-07-15 NOTE — PROGRESS NOTES
"              Follow Up Office Visit      Date: 07/15/2024   Patient Name: Ivett Patrick  : 2000   MRN: [id number]     Referring Provider: Karli Crook PA-C    Chief Complaint:      ICD-10-CM ICD-9-CM   1. Generalized anxiety disorder  F41.1 300.02   2. ADHD (attention deficit hyperactivity disorder), inattentive type  F90.0 314.00        History of Present Illness:   Ivett Patrick is a 23 y.o. female who is here today for follow up with medication management for ADHD and anxiety.  She reports that she feels like she is doing well, and has been taking her medications regularly.  She has noticed a general improvement in focus, stating, \"I feel better on the medicine, much less overwhelmed.\"  She reports decreased irritability, improvement in focus/concentration, and is noticing more patterns in her behavior that she would like to change.  She has also noticed that she is more emotional lately, and is developing her ability to express her emotions in a healthy way.  She states, \"I feel like I am crying more when I am upset, is not normal?\"  She has a history of suppressing her emotions, but is trying to build better habits regarding self-care and self awareness.  She reports difficulty sleeping, though says, \"It is mainly because my  is not home, he is out of town to work.  When he is there, it is easy for me to sleep.\"    Subjective     Review of Systems:   Review of Systems   Psychiatric/Behavioral:  Positive for sleep disturbance. The patient is nervous/anxious.        Screening Scores:   PHQ-9 : 2 (last visit, 5)  HOLLAND-7 : 1 (last visit, 8)    Medications:     Current Outpatient Medications:     amphetamine-dextroamphetamine XR (Adderall XR) 15 MG 24 hr capsule, Take 1 capsule by mouth Daily, Disp: 30 capsule, Rfl: 0    Drospirenone-Estetrol (Nextstellis) 3-14.2 MG tablet, Take 1 tablet by mouth Daily., Disp: 84 tablet, Rfl: 3    Levomilnacipran HCl ER 80 MG capsule sustained-release 24 hr, Take 1 " "capsule by mouth Daily., Disp: 30 capsule, Rfl: 2    busPIRone (BUSPAR) 10 MG tablet, Take 1 tablet by mouth 2 (Two) Times a Day., Disp: 60 tablet, Rfl: 2    Allergies:   Allergies   Allergen Reactions    Amoxicillin Swelling     rash    Penicillins Swelling     Rash         Results Reviewed: n/a     The following portion of the patient's history were reviewed and updated appropriately: allergies, current and past medications, family history, medical history and social history.    Objective     Vital Signs:   Vitals:    07/15/24 1624   BP: 116/75   Pulse: 97   SpO2: 100%   Weight: 70.8 kg (156 lb)   Height: 154 cm (60.63\")     Body mass index is 29.84 kg/m².     Mental Status Exam:   MENTAL STATUS EXAM   General Appearance:  Cleanly groomed and dressed  Eye Contact:  Good eye contact  Attitude:  Cooperative  Motor Activity:  Normal gait, posture  Muscle Strength:  Normal  Speech:  Normal rate, tone, volume  Language:  Spontaneous  Mood and affect:  Normal, pleasant  Hopelessness:  1  Loneliness: 3  Thought Process:  Logical and goal-directed  Associations/ Thought Content:  No delusions  Hallucinations:  None  Suicidal Ideations:  Not present  Homicidal Ideation:  Not present  Sensorium:  Alert and clear  Orientation:  Person, place, time and situation  Immediate Recall, Recent, and Remote Memory:  Intact  Attention Span/ Concentration:  Good  Fund of Knowledge:  Appropriate for age and educational level  Intellectual Functioning:  Average range  Insight:  Good  Judgement:  Good  Reliability:  Good  Impulse Control:  Good        SUICIDE RISK ASSESSMENT/CSSRS:  1. Does patient have thoughts of suicide? no  2. Does patient have intent for suicide? no  3. Does patient have a current plan for suicide? no  4. History of suicide attempts: no  5. Family history of suicide or attempts: no  6. History of violent behaviors towards others or property or thoughts of committing suicide: no  7. History of sexual aggression toward " others: no  8. Access to firearms or weapons: no    Labs Reviewed: n/a  UDS Reviewed: n/a  Chart since last visit reviewed: yes    Assessment / Plan    Quality Measures:  Tobacco cessation: Patient denies tobacco use. No tobacco cessation education necessary.    Depression (PHQ >9): Patient screened negative this visit with a PHQ score < 9. Will continue to monitor screening scores and provide supportive care.    Medication Considerations:  Benzo: n/a  Stimulants: CSA up to date and on file   DANIEL reviewed and appropriate.     Risk Assessment: Risk of self-harm acutely is low. Risk of self-harm chronically is also low, but could be further elevated in the event of treatment noncompliance and/or AODA.    Visit Diagnosis/Orders Placed This Visit:  Diagnoses and all orders for this visit:    1. Generalized anxiety disorder (Primary)  -     busPIRone (BUSPAR) 10 MG tablet; Take 1 tablet by mouth 2 (Two) Times a Day.  Dispense: 60 tablet; Refill: 2    2. ADHD (attention deficit hyperactivity disorder), inattentive type  -     amphetamine-dextroamphetamine XR (Adderall XR) 15 MG 24 hr capsule; Take 1 capsule by mouth Daily  Dispense: 30 capsule; Refill: 0         Impression/Formulation:  Patient appeared alert and oriented.  Patient is voluntarily continuing to receive psychiatric care at [facility].   Patient is receptive to assistance with maintaining a stable lifestyle.  Patient presents with history of     ICD-10-CM ICD-9-CM   1. Generalized anxiety disorder  F41.1 300.02   2. ADHD (attention deficit hyperactivity disorder), inattentive type  F90.0 314.00   .     Treatment Plan:   Continue Fetzima 80 mg daily.  Continue Adderall XR 15 mg daily.  Start BuSpar 10 mg twice daily as needed for increased anxiety.  Discussed book recommendations and establishing care with a therapist.  Follow-up in 4 weeks.    Any medications prescribed have been sent electronically to [facility] in [place].      Patient will continue supportive psychotherapy efforts and medications as indicated.  Discussed medication options and treatment plan of prescribed medication(s) as well as the risks, benefits, and potential side effects. Patient will contact this office, call 911 or present to the nearest emergency room should suicidal or homicidal ideations occur. Clinic will obtain release of information for current treatment team for continuity of care as needed. Patient ackowledged and verbally consented to continue with current treatment plan and was educated on the importance of compliance with treatment and follow-up appointments.     Follow Up:   Return in about 4 weeks (around 8/12/2024) for Medication Management.        JAVIER Lee  [facility]    This is electronically signed by JAVIER Lee  07/15/2024 12:54 EDT

## 2024-08-12 ENCOUNTER — OFFICE VISIT (OUTPATIENT)
Age: 24
End: 2024-08-12
Payer: COMMERCIAL

## 2024-08-12 ENCOUNTER — LAB (OUTPATIENT)
Dept: FAMILY MEDICINE CLINIC | Facility: CLINIC | Age: 24
End: 2024-08-12
Payer: COMMERCIAL

## 2024-08-12 VITALS
SYSTOLIC BLOOD PRESSURE: 109 MMHG | WEIGHT: 156 LBS | DIASTOLIC BLOOD PRESSURE: 67 MMHG | OXYGEN SATURATION: 99 % | HEIGHT: 61 IN | HEART RATE: 94 BPM | BODY MASS INDEX: 29.45 KG/M2

## 2024-08-12 DIAGNOSIS — F41.1 GENERALIZED ANXIETY DISORDER: ICD-10-CM

## 2024-08-12 DIAGNOSIS — Z51.81 THERAPEUTIC DRUG MONITORING: ICD-10-CM

## 2024-08-12 DIAGNOSIS — F90.0 ADHD (ATTENTION DEFICIT HYPERACTIVITY DISORDER), INATTENTIVE TYPE: Primary | ICD-10-CM

## 2024-08-12 PROCEDURE — G0483 DRUG TEST DEF 22+ CLASSES: HCPCS

## 2024-08-12 PROCEDURE — 80307 DRUG TEST PRSMV CHEM ANLYZR: CPT

## 2024-08-12 PROCEDURE — 99214 OFFICE O/P EST MOD 30 MIN: CPT

## 2024-08-12 RX ORDER — DEXTROAMPHETAMINE SACCHARATE, AMPHETAMINE ASPARTATE MONOHYDRATE, DEXTROAMPHETAMINE SULFATE AND AMPHETAMINE SULFATE 5; 5; 5; 5 MG/1; MG/1; MG/1; MG/1
20 CAPSULE, EXTENDED RELEASE ORAL DAILY
Qty: 30 CAPSULE | Refills: 0 | Status: SHIPPED | OUTPATIENT
Start: 2024-08-12

## 2024-08-12 NOTE — PROGRESS NOTES
Follow Up Office Visit      Date: 2024   Patient Name: Ivett Patrick  : 2000   MRN: [id number]     Referring Provider: Karli Crook PA-C    Chief Complaint:      ICD-10-CM ICD-9-CM   1. ADHD (attention deficit hyperactivity disorder), inattentive type  F90.0 314.00   2. Generalized anxiety disorder  F41.1 300.02   3. Therapeutic drug monitoring  Z51.81 V58.83        History of Present Illness:   Ivett Patrick is a 23 y.o. female who is here today for follow up with medication management for ADHD and anxiety.  She says that she is doing well today, although it has been a stressful past 2 weeks.  Her son had surgery, and she has been caring for a slowly recovering toddler, who has an appointment scheduled for later today to address the issues.  She continues to take the Fetzima daily, and has only needed BuSpar once in the past month.  Adderall XR continues to be helpful at maintaining her focus and executive functioning, although she notices the effects are completely gone by around 2 PM. No SI/HI/psychotic/manic symptoms present, no adverse effects or side effects to current medications noted, and no issues with appetite or sleep reported.     Subjective     Review of Systems:   Review of Systems   Psychiatric/Behavioral:  Positive for stress.        Screening Scores:   PHQ-9 : 2 (last visit, 2)  HOLLAND-7 : 2 (last visit, 1)    Medications:     Current Outpatient Medications:     amphetamine-dextroamphetamine XR (ADDERALL XR) 20 MG 24 hr capsule, Take 1 capsule by mouth Daily, Disp: 30 capsule, Rfl: 0    busPIRone (BUSPAR) 10 MG tablet, Take 1 tablet by mouth 2 (Two) Times a Day., Disp: 60 tablet, Rfl: 2    Drospirenone-Estetrol (Nextstellis) 3-14.2 MG tablet, Take 1 tablet by mouth Daily., Disp: 84 tablet, Rfl: 3    Levomilnacipran HCl ER 80 MG capsule sustained-release 24 hr, Take 1 capsule by mouth Daily., Disp: 30 capsule, Rfl: 2    Allergies:   Allergies   Allergen Reactions     "Amoxicillin Swelling     rash    Penicillins Swelling     Rash         Results Reviewed: n/a     The following portion of the patient's history were reviewed and updated appropriately: allergies, current and past medications, family history, medical history and social history.    Objective     Vital Signs:   Vitals:    08/12/24 1526   BP: 109/67   Pulse: 94   SpO2: 99%   Weight: 70.8 kg (156 lb)   Height: 154 cm (60.63\")     Body mass index is 29.84 kg/m².     Mental Status Exam:   MENTAL STATUS EXAM   General Appearance:  Cleanly groomed and dressed  Eye Contact:  Good eye contact  Attitude:  Cooperative  Motor Activity:  Normal gait, posture  Muscle Strength:  Normal  Speech:  Normal rate, tone, volume  Language:  Spontaneous  Mood and affect:  Normal, pleasant  Hopelessness:  Denies  Loneliness: Denies  Thought Process:  Logical  Associations/ Thought Content:  No delusions  Hallucinations:  None  Suicidal Ideations:  Not present  Homicidal Ideation:  Not present  Sensorium:  Alert and clear  Orientation:  Person, place, time and situation  Immediate Recall, Recent, and Remote Memory:  Intact  Attention Span/ Concentration:  Good  Fund of Knowledge:  Appropriate for age and educational level  Intellectual Functioning:  Average range  Insight:  Good  Judgement:  Good  Reliability:  Good  Impulse Control:  Good        SUICIDE RISK ASSESSMENT/CSSRS:  1. Does patient have thoughts of suicide? no  2. Does patient have intent for suicide? no  3. Does patient have a current plan for suicide? no  4. History of suicide attempts: no  5. Family history of suicide or attempts: no  6. History of violent behaviors towards others or property or thoughts of committing suicide: no  7. History of sexual aggression toward others: no  8. Access to firearms or weapons: no    Labs Reviewed: n/a  UDS Reviewed: n/a  Chart since last visit reviewed: yes    Assessment / Plan    Quality Measures:  Tobacco cessation: Patient denies tobacco " use. No tobacco cessation education necessary.    Depression (PHQ >9): Patient screened negative this visit with a PHQ score < 9. Will continue to monitor screening scores and provide supportive care.    Medication Considerations:  Benzo: n/a  Stimulants: CSA up to date and on file     DANIEL reviewed and appropriate.     Risk Assessment: Risk of self-harm acutely is low. Risk of self-harm chronically is also low, but could be further elevated in the event of treatment noncompliance and/or AODA.    Visit Diagnosis/Orders Placed This Visit:  Diagnoses and all orders for this visit:    1. ADHD (attention deficit hyperactivity disorder), inattentive type (Primary)  -     amphetamine-dextroamphetamine XR (ADDERALL XR) 20 MG 24 hr capsule; Take 1 capsule by mouth Daily  Dispense: 30 capsule; Refill: 0    2. Generalized anxiety disorder    3. Therapeutic drug monitoring  -     Compliance Drug Analysis, Ur - Urine, Clean Catch         Impression/Formulation:  Patient appeared alert and oriented.  Patient is voluntarily continuing to receive psychiatric care at [facility].   Patient is receptive to assistance with maintaining a stable lifestyle.  Patient presents with history of     ICD-10-CM ICD-9-CM   1. ADHD (attention deficit hyperactivity disorder), inattentive type  F90.0 314.00   2. Generalized anxiety disorder  F41.1 300.02   3. Therapeutic drug monitoring  Z51.81 V58.83   .     Treatment Plan:   Continue Fetzima 80 mg daily.  Continue BuSpar 10 mg up to twice daily as needed for anxiety.  Increase Adderall XR to 20 mg daily.  Follow-up in 4 weeks.    Any medications prescribed have been sent electronically to [facility] in [place].     Patient will continue supportive psychotherapy efforts and medications as indicated.  Discussed medication options and treatment plan of prescribed medication(s) as well as the risks, benefits, and potential side effects. Patient will contact this office, call  911 or present to the nearest emergency room should suicidal or homicidal ideations occur. Clinic will obtain release of information for current treatment team for continuity of care as needed. Patient ackowledged and verbally consented to continue with current treatment plan and was educated on the importance of compliance with treatment and follow-up appointments.     Follow Up:   Return in about 4 weeks (around 9/9/2024) for Medication Management.        JAVIER Lee  [facility]    This is electronically signed by JAVIER Lee  08/12/2024 09:06 EDT

## 2024-08-22 LAB — DRUGS UR: NORMAL

## 2024-09-09 ENCOUNTER — OFFICE VISIT (OUTPATIENT)
Age: 24
End: 2024-09-09
Payer: COMMERCIAL

## 2024-09-09 VITALS
SYSTOLIC BLOOD PRESSURE: 130 MMHG | OXYGEN SATURATION: 96 % | WEIGHT: 156 LBS | HEIGHT: 61 IN | BODY MASS INDEX: 29.45 KG/M2 | HEART RATE: 87 BPM | DIASTOLIC BLOOD PRESSURE: 94 MMHG

## 2024-09-09 DIAGNOSIS — F41.1 GENERALIZED ANXIETY DISORDER: Primary | ICD-10-CM

## 2024-09-09 DIAGNOSIS — F90.0 ADHD (ATTENTION DEFICIT HYPERACTIVITY DISORDER), INATTENTIVE TYPE: ICD-10-CM

## 2024-09-09 PROCEDURE — 99214 OFFICE O/P EST MOD 30 MIN: CPT

## 2024-09-09 RX ORDER — DEXTROAMPHETAMINE SACCHARATE, AMPHETAMINE ASPARTATE MONOHYDRATE, DEXTROAMPHETAMINE SULFATE AND AMPHETAMINE SULFATE 5; 5; 5; 5 MG/1; MG/1; MG/1; MG/1
20 CAPSULE, EXTENDED RELEASE ORAL DAILY
Qty: 30 CAPSULE | Refills: 0 | Status: SHIPPED | OUTPATIENT
Start: 2024-09-09

## 2024-09-09 NOTE — PROGRESS NOTES
Follow Up Office Visit      Date: 2024   Patient Name: Ivett Patrick  : 2000   MRN: [id number]     Referring Provider: Karli Crook PA-C    Chief Complaint:      ICD-10-CM ICD-9-CM   1. Generalized anxiety disorder  F41.1 300.02   2. ADHD (attention deficit hyperactivity disorder), inattentive type  F90.0 314.00        History of Present Illness:   Ivett Patrick is a 23 y.o. female who is here today for follow up with medication management for ADHD and anxiety.  Since adjusting her Adderall dose, she reports that the medication is working much better, enabling her to engage in her schoolwork, have improved focus and task completion, and keep her anxiety under control.  She takes in the Fetzima daily, but does not take the BuSpar very often, just as needed.  No SI/HI/psychotic/manic symptoms present, no adverse effects or side effects to current medications noted, and no issues with appetite or sleep reported.     Subjective     Review of Systems:   Review of Systems   Psychiatric/Behavioral: Negative.         Screening Scores:   PHQ-9 : 1 (last visit, 2)  HOLLAND-7 : 2 (last visit, 2)    Medications:     Current Outpatient Medications:     amphetamine-dextroamphetamine XR (ADDERALL XR) 20 MG 24 hr capsule, Take 1 capsule by mouth Daily, Disp: 30 capsule, Rfl: 0    busPIRone (BUSPAR) 10 MG tablet, Take 1 tablet by mouth 2 (Two) Times a Day., Disp: 60 tablet, Rfl: 2    Drospirenone-Estetrol (Nextstellis) 3-14.2 MG tablet, Take 1 tablet by mouth Daily., Disp: 84 tablet, Rfl: 3    Levomilnacipran HCl ER 80 MG capsule sustained-release 24 hr, Take 1 capsule by mouth Daily., Disp: 30 capsule, Rfl: 2    Allergies:   Allergies   Allergen Reactions    Amoxicillin Swelling     rash    Penicillins Swelling     Rash         Results Reviewed: n/a     The following portion of the patient's history were reviewed and updated appropriately: allergies, current and past medications, family history, medical  "history and social history.    Objective     Vital Signs:   Vitals:    09/09/24 1631   BP: 130/94   Pulse: 87   SpO2: 96%   Weight: 70.8 kg (156 lb)   Height: 154 cm (60.63\")     Body mass index is 29.84 kg/m².     Mental Status Exam:   MENTAL STATUS EXAM   General Appearance:  Cleanly groomed and dressed  Eye Contact:  Good eye contact  Attitude:  Cooperative  Motor Activity:  Normal gait, posture and fidgety  Muscle Strength:  Normal  Speech:  Normal rate, tone, volume  Language:  Spontaneous  Mood and affect:  Normal, pleasant and anxious  Hopelessness:  Denies  Loneliness: Denies  Thought Process:  Logical  Associations/ Thought Content:  No delusions  Hallucinations:  None  Suicidal Ideations:  Not present  Homicidal Ideation:  Not present  Sensorium:  Alert and clear  Orientation:  Time, person, situation and place  Immediate Recall, Recent, and Remote Memory:  Deficit noted  Attention Span/ Concentration:  Good  Fund of Knowledge:  Appropriate for age and educational level  Intellectual Functioning:  Average range  Insight:  Good  Judgement:  Good  Reliability:  Good  Impulse Control:  Good        SUICIDE RISK ASSESSMENT/CSSRS:  1. Does patient have thoughts of suicide? no  2. Does patient have intent for suicide? no  3. Does patient have a current plan for suicide? no  4. History of suicide attempts: no  5. Family history of suicide or attempts: no  6. History of violent behaviors towards others or property or thoughts of committing suicide: no  7. History of sexual aggression toward others: no  8. Access to firearms or weapons: no    Labs Reviewed: n/a  UDS Reviewed: 8/12/24, results as expected  Chart since last visit reviewed: yes    Assessment / Plan    Quality Measures:  Tobacco cessation: Patient denies tobacco use. No tobacco cessation education necessary.     Depression (PHQ >9): Patient screened negative this visit with a PHQ score < 9. Will continue to monitor screening scores and provide " supportive care.    Medication Considerations:  Benzo: n/a  Stimulants: CSA up to date and on file   DANIEL reviewed and appropriate.     Risk Assessment: Risk of self-harm acutely is low. Risk of self-harm chronically is also low, but could be further elevated in the event of treatment noncompliance and/or AODA.    Visit Diagnosis/Orders Placed This Visit:  Diagnoses and all orders for this visit:    1. Generalized anxiety disorder (Primary)    2. ADHD (attention deficit hyperactivity disorder), inattentive type  -     amphetamine-dextroamphetamine XR (ADDERALL XR) 20 MG 24 hr capsule; Take 1 capsule by mouth Daily  Dispense: 30 capsule; Refill: 0         Impression/Formulation:  Patient appeared alert and oriented.  Patient is voluntarily continuing to receive psychiatric care at [facility].   Patient is receptive to assistance with maintaining a stable lifestyle.  Patient presents with history of     ICD-10-CM ICD-9-CM   1. Generalized anxiety disorder  F41.1 300.02   2. ADHD (attention deficit hyperactivity disorder), inattentive type  F90.0 314.00   .     Treatment Plan:   Continue Adderall XR 20 mg daily.  Continue Fetzima 80 mg daily.  Continue BuSpar 10 mg up to 2 times a day as needed for anxiety.  Follow-up in 8 weeks.    Any medications prescribed have been sent electronically to [facility] in [place].     Patient will continue supportive psychotherapy efforts and medications as indicated.  Discussed medication options and treatment plan of prescribed medication(s) as well as the risks, benefits, and potential side effects. Patient will contact this office, call 911 or present to the nearest emergency room should suicidal or homicidal ideations occur. Clinic will obtain release of information for current treatment team for continuity of care as needed. Patient ackowledged and verbally consented to continue with current treatment plan and was educated on the importance of compliance  with treatment and follow-up appointments.     Follow Up:   Return in about 8 weeks (around 11/4/2024) for Medication Management.        JAVIER Lee  [facility]    This is electronically signed by JAVIER Lee  09/09/2024 17:06 EDT

## 2024-09-16 ENCOUNTER — TELEPHONE (OUTPATIENT)
Age: 24
End: 2024-09-16
Payer: COMMERCIAL

## 2024-09-16 DIAGNOSIS — F41.1 GENERALIZED ANXIETY DISORDER: ICD-10-CM

## 2024-10-21 DIAGNOSIS — F90.0 ADHD (ATTENTION DEFICIT HYPERACTIVITY DISORDER), INATTENTIVE TYPE: ICD-10-CM

## 2024-10-21 RX ORDER — DEXTROAMPHETAMINE SACCHARATE, AMPHETAMINE ASPARTATE MONOHYDRATE, DEXTROAMPHETAMINE SULFATE AND AMPHETAMINE SULFATE 5; 5; 5; 5 MG/1; MG/1; MG/1; MG/1
20 CAPSULE, EXTENDED RELEASE ORAL DAILY
Qty: 30 CAPSULE | Refills: 0 | Status: SHIPPED | OUTPATIENT
Start: 2024-10-21 | End: 2024-10-28 | Stop reason: RX

## 2024-10-21 NOTE — TELEPHONE ENCOUNTER
Rx Refill Note  Requested Prescriptions     Pending Prescriptions Disp Refills    amphetamine-dextroamphetamine XR (ADDERALL XR) 20 MG 24 hr capsule 30 capsule 0     Sig: Take 1 capsule by mouth Daily      Last office visit with prescribing clinician: 9/9/2024   Last telemedicine visit with prescribing clinician: 6/12/2024   Next office visit with prescribing clinician: 11/4/2024                         Would you like a call back once the refill request has been completed: [] Yes [] No    If the office needs to give you a call back, can they leave a voicemail: [] Yes [] No    Jose David MA  10/21/24, 11:34 EDT

## 2024-10-28 ENCOUNTER — TELEPHONE (OUTPATIENT)
Age: 24
End: 2024-10-28
Payer: COMMERCIAL

## 2024-10-28 DIAGNOSIS — F90.0 ADHD (ATTENTION DEFICIT HYPERACTIVITY DISORDER), INATTENTIVE TYPE: Primary | ICD-10-CM

## 2024-10-28 RX ORDER — DEXTROAMPHETAMINE SACCHARATE, AMPHETAMINE ASPARTATE, DEXTROAMPHETAMINE SULFATE AND AMPHETAMINE SULFATE 1.875; 1.875; 1.875; 1.875 MG/1; MG/1; MG/1; MG/1
7.5 TABLET ORAL
Qty: 30 TABLET | Refills: 0 | Status: SHIPPED | OUTPATIENT
Start: 2024-10-28

## 2024-10-28 RX ORDER — DEXTROAMPHETAMINE SACCHARATE, AMPHETAMINE ASPARTATE MONOHYDRATE, DEXTROAMPHETAMINE SULFATE AND AMPHETAMINE SULFATE 3.75; 3.75; 3.75; 3.75 MG/1; MG/1; MG/1; MG/1
15 CAPSULE, EXTENDED RELEASE ORAL EVERY MORNING
Qty: 30 CAPSULE | Refills: 0 | Status: SHIPPED | OUTPATIENT
Start: 2024-10-28

## 2024-10-28 NOTE — TELEPHONE ENCOUNTER
Patient called stating Adderall XR is on backorder, needing immediate release rx to hold over until available.

## 2024-10-28 NOTE — TELEPHONE ENCOUNTER
Called patient and discussed alternative, patient discussed alternative prescription; lower dose extended release in the morning, with an afternoon booster.  Patient agrees, prescription sent.

## 2024-11-07 ENCOUNTER — FLU SHOT (OUTPATIENT)
Dept: FAMILY MEDICINE CLINIC | Facility: CLINIC | Age: 24
End: 2024-11-07
Payer: COMMERCIAL

## 2024-11-07 DIAGNOSIS — Z23 NEED FOR INFLUENZA VACCINATION: Primary | ICD-10-CM

## 2024-11-07 PROCEDURE — 90471 IMMUNIZATION ADMIN: CPT | Performed by: FAMILY MEDICINE

## 2024-11-07 PROCEDURE — 90656 IIV3 VACC NO PRSV 0.5 ML IM: CPT | Performed by: FAMILY MEDICINE

## 2024-11-13 ENCOUNTER — OFFICE VISIT (OUTPATIENT)
Age: 24
End: 2024-11-13
Payer: COMMERCIAL

## 2024-11-13 VITALS
HEART RATE: 86 BPM | OXYGEN SATURATION: 99 % | WEIGHT: 156.2 LBS | BODY MASS INDEX: 29.49 KG/M2 | DIASTOLIC BLOOD PRESSURE: 92 MMHG | SYSTOLIC BLOOD PRESSURE: 134 MMHG | HEIGHT: 61 IN

## 2024-11-13 DIAGNOSIS — F51.5 NIGHTMARES: ICD-10-CM

## 2024-11-13 DIAGNOSIS — F90.0 ADHD (ATTENTION DEFICIT HYPERACTIVITY DISORDER), INATTENTIVE TYPE: Primary | ICD-10-CM

## 2024-11-13 DIAGNOSIS — F41.1 GENERALIZED ANXIETY DISORDER: ICD-10-CM

## 2024-11-13 RX ORDER — PRAZOSIN HYDROCHLORIDE 1 MG/1
CAPSULE ORAL
Qty: 90 CAPSULE | Refills: 1 | Status: SHIPPED | OUTPATIENT
Start: 2024-11-13

## 2024-11-13 NOTE — PROGRESS NOTES
Follow Up Office Visit      Date: 2024   Patient Name: Ivett Patrick  : 2000   MRN: [id number]     Referring Provider: Arsh Ramirez MD    Chief Complaint:      ICD-10-CM ICD-9-CM   1. ADHD (attention deficit hyperactivity disorder), inattentive type  F90.0 314.00   2. Generalized anxiety disorder  F41.1 300.02   3. Nightmares  F51.5 307.47        History of Present Illness:   Ivett Patrick is a 24 y.o. female who is here today for follow up with medication management for ADHD and anxiety.  She reports that she is doing really well today, and thinks her medication combination is controlling her symptoms well.  She does report disrupted sleep, with nightmares every night.  This has been an issue since she was a child, and causes frequent wakings throughout the night, and feeling unrested the next day.  Other than that, no complaints.  No SI/HI/psychotic/manic symptoms present, no adverse effects or side effects to current medications noted, and no issues with appetite reported.     Subjective     Review of Systems:   Review of Systems   Psychiatric/Behavioral:  Positive for sleep disturbance.        Screening Scores:   PHQ-9: 3 (last visit, 1)  HOLLAND-7: 4 (last visit, 2)    Medications:     Current Outpatient Medications:     Levomilnacipran HCl ER 80 MG capsule sustained-release 24 hr, Take 1 capsule by mouth Daily., Disp: 90 capsule, Rfl: 1    amphetamine-dextroamphetamine (Adderall) 7.5 MG tablet, Take 1 tablet by mouth Every Afternoon., Disp: 30 tablet, Rfl: 0    amphetamine-dextroamphetamine XR (Adderall XR) 15 MG 24 hr capsule, Take 1 capsule by mouth Every Morning, Disp: 30 capsule, Rfl: 0    busPIRone (BUSPAR) 10 MG tablet, Take 1 tablet by mouth 2 (Two) Times a Day., Disp: 60 tablet, Rfl: 2    Drospirenone-Estetrol (Nextstellis) 3-14.2 MG tablet, Take 1 tablet by mouth Daily., Disp: 84 tablet, Rfl: 3    prazosin (MINIPRESS) 1 MG capsule, Take 1 to 3 capsules nightly for  "nightmares., Disp: 90 capsule, Rfl: 1    Allergies:   Allergies   Allergen Reactions    Amoxicillin Swelling     rash    Penicillins Swelling     Rash         Results Reviewed: n/a     The following portion of the patient's history were reviewed and updated appropriately: allergies, current and past medications, family history, medical history and social history.    Objective     Vital Signs:   Vitals:    11/13/24 1714   BP: 134/92   Pulse: 86   SpO2: 99%   Weight: 70.9 kg (156 lb 3.2 oz)   Height: 154 cm (60.63\")     Body mass index is 29.88 kg/m².     Mental Status Exam:   MENTAL STATUS EXAM   General Appearance:  Cleanly groomed and dressed  Eye Contact:  Good eye contact  Attitude:  Cooperative  Motor Activity:  Normal gait, posture and fidgety  Muscle Strength:  Normal  Speech:  Normal rate, tone, volume  Language:  Spontaneous  Mood and affect:  Normal, pleasant  Hopelessness:  Denies  Loneliness: Denies  Thought Process:  Logical  Associations/ Thought Content:  No delusions  Hallucinations:  None  Suicidal Ideations:  Not present  Homicidal Ideation:  Not present  Sensorium:  Alert and clear  Orientation:  Person, place, situation and time  Immediate Recall, Recent, and Remote Memory:  Intact  Attention Span/ Concentration:  Good  Fund of Knowledge:  Appropriate for age and educational level  Intellectual Functioning:  Average range  Insight:  Good  Judgement:  Good  Reliability:  Good  Impulse Control:  Good        SUICIDE RISK ASSESSMENT/CSSRS:  1. Does patient have thoughts of suicide? no  2. Does patient have intent for suicide? no  3. Does patient have a current plan for suicide? no  4. History of suicide attempts: no  5. Family history of suicide or attempts: no  6. History of violent behaviors towards others or property or thoughts of committing suicide: no  7. History of sexual aggression toward others: no  8. Access to firearms or weapons: no    Labs Reviewed: n/a  UDS Reviewed: 8/12/24, results as " expected  Chart since last visit reviewed: yes    Assessment / Plan    Quality Measures:  Tobacco cessation: Patient denies tobacco use. No tobacco cessation education necessary.    Depression (PHQ >9): Patient screened negative this visit with a PHQ score < 9. Will continue to monitor screening scores and provide supportive care.    Medication Considerations:  Benzo: n/a  Stimulants: CSA up to date and on file   DANIEL reviewed and appropriate.     Risk Assessment: Risk of self-harm acutely is low. Risk of self-harm chronically is also low, but could be further elevated in the event of treatment noncompliance and/or AODA.    Visit Diagnosis/Orders Placed This Visit:  Diagnoses and all orders for this visit:    1. ADHD (attention deficit hyperactivity disorder), inattentive type (Primary)    2. Generalized anxiety disorder  -     Levomilnacipran HCl ER 80 MG capsule sustained-release 24 hr; Take 1 capsule by mouth Daily.  Dispense: 90 capsule; Refill: 1    3. Nightmares  -     prazosin (MINIPRESS) 1 MG capsule; Take 1 to 3 capsules nightly for nightmares.  Dispense: 90 capsule; Refill: 1         Impression/Formulation:  Patient appeared alert and oriented.  Patient is voluntarily continuing to receive psychiatric care at [facility].   Patient is receptive to assistance with maintaining a stable lifestyle.  Patient presents with history of     ICD-10-CM ICD-9-CM   1. ADHD (attention deficit hyperactivity disorder), inattentive type  F90.0 314.00   2. Generalized anxiety disorder  F41.1 300.02   3. Nightmares  F51.5 307.47   .     Treatment Plan:   Continue Adderall XR 15 mg daily in the morning, with Adderall 7.5 mg in the afternoon.  Continue Fetzima 80 mg daily.  Continue BuSpar 10 mg up to twice daily as needed.  Start prazosin at 1 mg nightly for nightmares.  Okay to increase dose slowly, up to 3 mg to effect.  Follow-up in 8 weeks.    Any medications prescribed have been sent  electronically to [facility] in [place].     Patient will continue supportive psychotherapy efforts and medications as indicated.  Discussed medication options and treatment plan of prescribed medication(s) as well as the risks, benefits, and potential side effects. Patient will contact this office, call 911 or present to the nearest emergency room should suicidal or homicidal ideations occur. Clinic will obtain release of information for current treatment team for continuity of care as needed. Patient ackowledged and verbally consented to continue with current treatment plan and was educated on the importance of compliance with treatment and follow-up appointments.     Follow Up:   Return in about 8 weeks (around 1/8/2025) for Medication Management.        JAVIER Lee  [facility]    This is electronically signed by JAVIER Lee  11/13/2024 17:57 EST

## 2024-11-19 ENCOUNTER — TRANSCRIBE ORDERS (OUTPATIENT)
Dept: ADMINISTRATIVE | Facility: HOSPITAL | Age: 24
End: 2024-11-19
Payer: COMMERCIAL

## 2024-11-19 DIAGNOSIS — J20.9 ACUTE BRONCHITIS, UNSPECIFIED ORGANISM: Primary | ICD-10-CM

## 2024-11-19 DIAGNOSIS — D24.9: Primary | ICD-10-CM

## 2024-11-19 RX ORDER — DOXYCYCLINE 100 MG/1
100 CAPSULE ORAL 2 TIMES DAILY
Qty: 20 CAPSULE | Refills: 0 | Status: SHIPPED | OUTPATIENT
Start: 2024-11-19

## 2024-11-19 RX ORDER — PREDNISONE 10 MG/1
TABLET ORAL
Qty: 36 TABLET | Refills: 0 | Status: SHIPPED | OUTPATIENT
Start: 2024-11-19 | End: 2024-11-26

## 2024-11-21 ENCOUNTER — OFFICE VISIT (OUTPATIENT)
Dept: FAMILY MEDICINE CLINIC | Facility: CLINIC | Age: 24
End: 2024-11-21
Payer: COMMERCIAL

## 2024-11-21 ENCOUNTER — LAB (OUTPATIENT)
Dept: FAMILY MEDICINE CLINIC | Facility: CLINIC | Age: 24
End: 2024-11-21
Payer: COMMERCIAL

## 2024-11-21 VITALS
TEMPERATURE: 98.2 F | HEART RATE: 122 BPM | BODY MASS INDEX: 30.23 KG/M2 | SYSTOLIC BLOOD PRESSURE: 117 MMHG | WEIGHT: 154 LBS | HEIGHT: 60 IN | DIASTOLIC BLOOD PRESSURE: 97 MMHG | OXYGEN SATURATION: 99 %

## 2024-11-21 DIAGNOSIS — J20.9 ACUTE BRONCHITIS, UNSPECIFIED ORGANISM: ICD-10-CM

## 2024-11-21 DIAGNOSIS — J20.9 ACUTE BRONCHITIS, UNSPECIFIED ORGANISM: Primary | ICD-10-CM

## 2024-11-21 DIAGNOSIS — R50.9 FEVER, UNSPECIFIED FEVER CAUSE: ICD-10-CM

## 2024-11-21 LAB
ALBUMIN SERPL-MCNC: 4.7 G/DL (ref 3.5–5.2)
ALBUMIN/GLOB SERPL: 1.6 G/DL
ALP SERPL-CCNC: 87 U/L (ref 39–117)
ALT SERPL W P-5'-P-CCNC: 15 U/L (ref 1–33)
ANION GAP SERPL CALCULATED.3IONS-SCNC: 12.6 MMOL/L (ref 5–15)
AST SERPL-CCNC: 16 U/L (ref 1–32)
BILIRUB SERPL-MCNC: <0.2 MG/DL (ref 0–1.2)
BUN SERPL-MCNC: 11 MG/DL (ref 6–20)
BUN/CREAT SERPL: 14.5 (ref 7–25)
CALCIUM SPEC-SCNC: 9.6 MG/DL (ref 8.6–10.5)
CHLORIDE SERPL-SCNC: 101 MMOL/L (ref 98–107)
CO2 SERPL-SCNC: 23.4 MMOL/L (ref 22–29)
CREAT SERPL-MCNC: 0.76 MG/DL (ref 0.57–1)
CRP SERPL-MCNC: <0.3 MG/DL (ref 0–0.5)
EGFRCR SERPLBLD CKD-EPI 2021: 112.4 ML/MIN/1.73
GLOBULIN UR ELPH-MCNC: 3 GM/DL
GLUCOSE SERPL-MCNC: 168 MG/DL (ref 65–99)
POTASSIUM SERPL-SCNC: 3.8 MMOL/L (ref 3.5–5.2)
PROT SERPL-MCNC: 7.7 G/DL (ref 6–8.5)
SODIUM SERPL-SCNC: 137 MMOL/L (ref 136–145)

## 2024-11-21 PROCEDURE — 99213 OFFICE O/P EST LOW 20 MIN: CPT | Performed by: FAMILY MEDICINE

## 2024-11-21 PROCEDURE — 80053 COMPREHEN METABOLIC PANEL: CPT | Performed by: FAMILY MEDICINE

## 2024-11-21 PROCEDURE — 36415 COLL VENOUS BLD VENIPUNCTURE: CPT | Performed by: FAMILY MEDICINE

## 2024-11-21 PROCEDURE — 85027 COMPLETE CBC AUTOMATED: CPT | Performed by: FAMILY MEDICINE

## 2024-11-21 PROCEDURE — 86665 EPSTEIN-BARR CAPSID VCA: CPT | Performed by: FAMILY MEDICINE

## 2024-11-21 PROCEDURE — 86140 C-REACTIVE PROTEIN: CPT | Performed by: FAMILY MEDICINE

## 2024-11-21 PROCEDURE — 86664 EPSTEIN-BARR NUCLEAR ANTIGEN: CPT | Performed by: FAMILY MEDICINE

## 2024-11-21 PROCEDURE — 85007 BL SMEAR W/DIFF WBC COUNT: CPT | Performed by: FAMILY MEDICINE

## 2024-11-21 PROCEDURE — 85652 RBC SED RATE AUTOMATED: CPT | Performed by: FAMILY MEDICINE

## 2024-11-21 NOTE — PROGRESS NOTES
Follow Up Office Visit      Date: 2024   Patient Name: Ivett Patrick  : 2000   MRN: [id number]     Chief Complaint:  No chief complaint on file.      History of Present Illness: Ivett Patrick is a 24 y.o. female who is here today for evaluation of some cough and congestion has been gradually worsening.  She has had some postnasal drip and has had some problems with some diarrhea after she started taking the doxycycline.  She has had a little bit of nausea.  Patient has not had any fever or chills at present time.  She is otherwise been doing relatively well up to this point.  She does continue to take her other medications as prescribed without any side effects.  Patient has not any wheeze at present time.  She has been exposed to individuals with similar symptomatology.  No other concerns.  Patient appears to be doing otherwise well.  They have continue with their medications without any side effects.  They have not had any changes in their usual activity, appetite and sleep.  Patient denies any other cardiovascular, respiratory, gastrointestinal, urologic or neurologic complaints.    History of Present Illness         Subjective      Review of Systems:   Review of Systems   Constitutional:  Negative for activity change, appetite change, chills, fatigue, fever and unexpected weight loss.   HENT:  Positive for postnasal drip and rhinorrhea. Negative for ear pain and sore throat.    Respiratory:  Positive for cough. Negative for chest tightness, shortness of breath and wheezing.    Cardiovascular:  Negative for chest pain, palpitations and leg swelling.   Gastrointestinal:  Negative for abdominal distention, abdominal pain, blood in stool, constipation, diarrhea, nausea, vomiting, GERD and indigestion.   Genitourinary:  Negative for difficulty urinating, dysuria, flank pain, frequency, hematuria and urgency.   Musculoskeletal:  Negative for arthralgias, back pain, gait problem, joint swelling and  myalgias.   Skin:  Negative for rash.   Neurological:  Negative for dizziness, tremors, seizures, syncope, weakness, light-headedness, numbness, headache and memory problem.   Psychiatric/Behavioral:  Negative for sleep disturbance and depressed mood. The patient is not nervous/anxious.        I have reviewed the patients family history, social history, past medical history, past surgical history and have updated it as appropriate.     Medications:     Current Outpatient Medications:     amphetamine-dextroamphetamine (Adderall) 7.5 MG tablet, Take 1 tablet by mouth Every Afternoon., Disp: 30 tablet, Rfl: 0    amphetamine-dextroamphetamine XR (Adderall XR) 15 MG 24 hr capsule, Take 1 capsule by mouth Every Morning, Disp: 30 capsule, Rfl: 0    busPIRone (BUSPAR) 10 MG tablet, Take 1 tablet by mouth 2 (Two) Times a Day., Disp: 60 tablet, Rfl: 2    doxycycline (VIBRAMYCIN) 100 MG capsule, Take 1 capsule by mouth 2 (Two) Times a Day., Disp: 20 capsule, Rfl: 0    Drospirenone-Estetrol (Nextstellis) 3-14.2 MG tablet, Take 1 tablet by mouth Daily., Disp: 84 tablet, Rfl: 3    Levomilnacipran HCl ER 80 MG capsule sustained-release 24 hr, Take 1 capsule by mouth Daily., Disp: 90 capsule, Rfl: 1    prazosin (MINIPRESS) 1 MG capsule, Take 1 to 3 capsules nightly for nightmares., Disp: 90 capsule, Rfl: 1    predniSONE (DELTASONE) 10 MG tablet, Take 8 tablets by mouth Daily for 1 day, THEN 7 tablets Daily for 1 day, THEN 6 tablets Daily for 1 day, THEN 5 tablets Daily for 1 day, THEN 4 tablets Daily for 1 day, THEN 3 tablets Daily for 1 day, THEN 2 tablets Daily for 1 day, THEN 1 tablet Daily for 1 day., Disp: 36 tablet, Rfl: 0    Allergies:   Allergies   Allergen Reactions    Amoxicillin Swelling     rash    Penicillins Swelling     Rash         Immunizations:   Immunization History   Administered Date(s) Administered    COVID-19 (MODERNA) 1st,2nd,3rd Dose Monovalent 10/21/2021    DTaP, Unspecified 2000, 02/22/2001,  "07/17/2001, 01/29/2002, 05/24/2005    Fluzone  >6mos 11/07/2024    Fluzone (or Fluarix & Flulaval for VFC) >6mos 10/28/2014, 09/29/2015, 09/20/2017, 11/08/2023    Hep B / HiB 2000, 01/29/2002    Hep B, Adolescent or Pediatric 2000, 2000, 01/29/2002    Hib (PRP-OMP) 02/22/2001    Hpv9 03/30/2023, 05/12/2023, 09/28/2023    IPV 2000, 02/22/2001, 01/29/2002, 05/24/2005    MMR 01/29/2002, 05/24/2005    MMRV 07/26/2023    Meningococcal, Unspecified 08/13/2012    PEDS-Pneumococcal Conjugate (PCV7) 2000    Tdap 08/13/2012, 10/29/2018    Varicella 05/27/2002, 08/27/2002, 08/13/2012        Objective     Physical Exam: Please see above  Vital Signs:   Vitals:    11/21/24 1419   BP: 117/97   BP Location: Right arm   Patient Position: Standing   Cuff Size: Adult   Pulse: (!) 122   Temp: 98.2 °F (36.8 °C)   TempSrc: Oral   SpO2: 99%   Weight: 69.9 kg (154 lb)   Height: 152.4 cm (60\")     Body mass index is 30.08 kg/m².  BMI is >= 25 and <30. (Overweight) The following options were offered after discussion;: exercise counseling/recommendations and nutrition counseling/recommendations       Physical Exam  Vitals and nursing note reviewed.   Constitutional:       Appearance: Normal appearance.   HENT:      Head: Normocephalic and atraumatic.      Nose: Nose normal.      Mouth/Throat:      Pharynx: Oropharynx is clear.   Eyes:      Extraocular Movements: Extraocular movements intact.      Pupils: Pupils are equal, round, and reactive to light.   Neck:      Thyroid: No thyroid mass or thyromegaly.      Trachea: Trachea normal.   Cardiovascular:      Rate and Rhythm: Normal rate and regular rhythm.      Pulses: Normal pulses. No decreased pulses.      Heart sounds: Normal heart sounds.   Pulmonary:      Effort: Pulmonary effort is normal.      Breath sounds: Normal breath sounds.   Abdominal:      General: Abdomen is flat. Bowel sounds are normal.      Palpations: Abdomen is soft.      Tenderness: There is " no abdominal tenderness.   Musculoskeletal:      Cervical back: Neck supple.      Right lower leg: No edema.      Left lower leg: No edema.   Lymphadenopathy:      Cervical: No cervical adenopathy.   Skin:     General: Skin is warm and dry.   Neurological:      General: No focal deficit present.      Mental Status: She is alert and oriented to person, place, and time.      Sensory: Sensation is intact.      Motor: Motor function is intact.      Coordination: Coordination is intact.   Psychiatric:         Attention and Perception: Attention normal.         Mood and Affect: Mood normal.         Speech: Speech normal.         Behavior: Behavior normal.         Procedures    Results:   Labs:   TSH   Date Value Ref Range Status   05/19/2021 0.754 0.270 - 4.200 uIU/mL Final        POCT Results (if applicable):   Results for orders placed or performed in visit on 11/21/24   Comprehensive Metabolic Panel    Collection Time: 11/21/24  2:19 PM    Specimen: Arm, Right; Blood   Result Value Ref Range    Glucose 168 (H) 65 - 99 mg/dL    BUN 11 6 - 20 mg/dL    Creatinine 0.76 0.57 - 1.00 mg/dL    Sodium 137 136 - 145 mmol/L    Potassium 3.8 3.5 - 5.2 mmol/L    Chloride 101 98 - 107 mmol/L    CO2 23.4 22.0 - 29.0 mmol/L    Calcium 9.6 8.6 - 10.5 mg/dL    Total Protein 7.7 6.0 - 8.5 g/dL    Albumin 4.7 3.5 - 5.2 g/dL    ALT (SGPT) 15 1 - 33 U/L    AST (SGOT) 16 1 - 32 U/L    Alkaline Phosphatase 87 39 - 117 U/L    Total Bilirubin <0.2 0.0 - 1.2 mg/dL    Globulin 3.0 gm/dL    A/G Ratio 1.6 g/dL    BUN/Creatinine Ratio 14.5 7.0 - 25.0    Anion Gap 12.6 5.0 - 15.0 mmol/L    eGFR 112.4 >60.0 mL/min/1.73   Sedimentation Rate    Collection Time: 11/21/24  2:19 PM    Specimen: Arm, Right; Blood   Result Value Ref Range    Sed Rate 9 0 - 20 mm/hr   C-reactive Protein    Collection Time: 11/21/24  2:19 PM    Specimen: Arm, Right; Blood   Result Value Ref Range    C-Reactive Protein <0.30 0.00 - 0.50 mg/dL   CBC Auto Differential     Collection Time: 11/21/24  2:19 PM    Specimen: Arm, Right; Blood   Result Value Ref Range    WBC 8.40 3.40 - 10.80 10*3/mm3    RBC 4.54 3.77 - 5.28 10*6/mm3    Hemoglobin 14.1 12.0 - 15.9 g/dL    Hematocrit 40.9 34.0 - 46.6 %    MCV 90.1 79.0 - 97.0 fL    MCH 31.1 26.6 - 33.0 pg    MCHC 34.5 31.5 - 35.7 g/dL    RDW 11.6 (L) 12.3 - 15.4 %    RDW-SD 37.7 37.0 - 54.0 fl    MPV 11.3 6.0 - 12.0 fL    Platelets 328 140 - 450 10*3/mm3   Manual Differential    Collection Time: 11/21/24  2:19 PM    Specimen: Arm, Right; Blood   Result Value Ref Range    Neutrophil % 95.0 (H) 42.7 - 76.0 %    Lymphocyte % 5.0 (L) 19.6 - 45.3 %    Monocyte % 0.0 (L) 5.0 - 12.0 %    Eosinophil % 0.0 (L) 0.3 - 6.2 %    Basophil % 0.0 0.0 - 1.5 %    Neutrophils Absolute 7.98 (H) 1.70 - 7.00 10*3/mm3    Lymphocytes Absolute 0.42 (L) 0.70 - 3.10 10*3/mm3    Monocytes Absolute 0.00 (L) 0.10 - 0.90 10*3/mm3    Eosinophils Absolute 0.00 0.00 - 0.40 10*3/mm3    Basophils Absolute 0.00 0.00 - 0.20 10*3/mm3    Polychromasia Slight/1+ None Seen    WBC Morphology Normal Normal    Platelet Morphology Normal Normal       Imaging:   No valid procedures specified.     Measures:   Advanced Care Planning:   Did not discuss.      Smoking Cessation:   Non-smoker.    Assessment / Plan      Assessment/Plan:   Diagnoses and all orders for this visit:    1. Acute bronchitis, unspecified organism (Primary)  Patient has symptoms that are consistent with bronchitis.  We have discussed options and will pursue with obtaining a chest x-ray.  We will also make arrangements for doxycycline as well as prednisone as we have right done so in the past.  We will continue to monitor her symptoms and if she has other issues or concerns further assessment treatment may be necessary.  Upon review of her chest x-ray did reveal that she was having some constipation.  We have obtained acute abdominal series and does reveal that she has a right-sided fecal impaction.  We have discussed  that we will pursue with magnesium citrate as well as MiraLAX and will monitor.  If she has worsening symptoms further evaluation will be necessary.  -     XR Chest PA & Lateral (In Office)  -     CBC With Manual Differential; Future  -     Comprehensive Metabolic Panel; Future  -     Sedimentation Rate; Future  -     C-reactive Protein; Future    2. Fever, unspecified fever cause  -     EBV Antibody Profile; Future        Follow Up:   Return if symptoms worsen or fail to improve.      At [facility], we believe that sharing information builds trust and better relationships. You are receiving this note because you recently visited [facility]. It is possible you will see health information before a provider has talked with you about it. This kind of information can be easy to misunderstand. To help you fully understand what it means for your health, we urge you to discuss this note with your provider.    Arsh Ramirez MD  [facility]

## 2024-11-22 LAB
BASOPHILS # BLD MANUAL: 0 10*3/MM3 (ref 0–0.2)
BASOPHILS NFR BLD MANUAL: 0 % (ref 0–1.5)
DEPRECATED RDW RBC AUTO: 37.7 FL (ref 37–54)
EOSINOPHIL # BLD MANUAL: 0 10*3/MM3 (ref 0–0.4)
EOSINOPHIL NFR BLD MANUAL: 0 % (ref 0.3–6.2)
ERYTHROCYTE [DISTWIDTH] IN BLOOD BY AUTOMATED COUNT: 11.6 % (ref 12.3–15.4)
ERYTHROCYTE [SEDIMENTATION RATE] IN BLOOD: 9 MM/HR (ref 0–20)
HCT VFR BLD AUTO: 40.9 % (ref 34–46.6)
HGB BLD-MCNC: 14.1 G/DL (ref 12–15.9)
LYMPHOCYTES # BLD MANUAL: 0.42 10*3/MM3 (ref 0.7–3.1)
LYMPHOCYTES NFR BLD MANUAL: 0 % (ref 5–12)
MCH RBC QN AUTO: 31.1 PG (ref 26.6–33)
MCHC RBC AUTO-ENTMCNC: 34.5 G/DL (ref 31.5–35.7)
MCV RBC AUTO: 90.1 FL (ref 79–97)
MONOCYTES # BLD: 0 10*3/MM3 (ref 0.1–0.9)
NEUTROPHILS # BLD AUTO: 7.98 10*3/MM3 (ref 1.7–7)
NEUTROPHILS NFR BLD MANUAL: 95 % (ref 42.7–76)
PLAT MORPH BLD: NORMAL
PLATELET # BLD AUTO: 328 10*3/MM3 (ref 140–450)
PMV BLD AUTO: 11.3 FL (ref 6–12)
POLYCHROMASIA BLD QL SMEAR: ABNORMAL
RBC # BLD AUTO: 4.54 10*6/MM3 (ref 3.77–5.28)
VARIANT LYMPHS NFR BLD MANUAL: 5 % (ref 19.6–45.3)
WBC MORPH BLD: NORMAL
WBC NRBC COR # BLD AUTO: 8.4 10*3/MM3 (ref 3.4–10.8)

## 2024-11-22 NOTE — PROGRESS NOTES
Patient was informed of results and verbalized good understanding and appreciation. She has done 2 capfuls  of Miralax  x 2days and half of a bottle of mag citrtate and still hasn't had a bowel movement.

## 2024-11-25 LAB
EBV NA IGG SER IA-ACNC: <18 U/ML (ref 0–17.9)
EBV VCA IGG SER IA-ACNC: 75.5 U/ML (ref 0–17.9)
EBV VCA IGM SER IA-ACNC: <36 U/ML (ref 0–35.9)
SERVICE CMNT-IMP: ABNORMAL

## 2024-11-26 DIAGNOSIS — K56.41 FECAL IMPACTION OF COLON: Primary | ICD-10-CM

## 2024-11-27 DIAGNOSIS — K56.41 FECAL IMPACTION OF COLON: Primary | ICD-10-CM

## 2024-11-27 DIAGNOSIS — K56.41 FECAL IMPACTION OF COLON: ICD-10-CM

## 2024-12-03 DIAGNOSIS — K64.5 THROMBOSED EXTERNAL HEMORRHOID: Primary | ICD-10-CM

## 2024-12-04 ENCOUNTER — OFFICE VISIT (OUTPATIENT)
Dept: SURGERY | Facility: CLINIC | Age: 24
End: 2024-12-04
Payer: COMMERCIAL

## 2024-12-04 VITALS
TEMPERATURE: 98 F | BODY MASS INDEX: 29.84 KG/M2 | HEART RATE: 103 BPM | DIASTOLIC BLOOD PRESSURE: 78 MMHG | HEIGHT: 60 IN | SYSTOLIC BLOOD PRESSURE: 116 MMHG | RESPIRATION RATE: 14 BRPM | OXYGEN SATURATION: 100 % | WEIGHT: 152 LBS

## 2024-12-04 DIAGNOSIS — K64.5 EXTERNAL THROMBOSED HEMORRHOIDS: ICD-10-CM

## 2024-12-04 DIAGNOSIS — K59.04 CHRONIC IDIOPATHIC CONSTIPATION: Primary | ICD-10-CM

## 2024-12-04 PROCEDURE — 99202 OFFICE O/P NEW SF 15 MIN: CPT | Performed by: SURGERY

## 2024-12-04 NOTE — PROGRESS NOTES
Subjective   Ivett Patrick is a 24 y.o. female.   Chief Complaint   Patient presents with    Hemorrhoids     Thrombosed        History of Present Illness The patient is in the office today for evaluation and treatment of an external thrombosed hemorrhoid. The patient was recently treated for a fecal impaction and started on Linzess 290 mg. The patient states she started the Linzess the middle of November. The patient states she still has a fecal impaction that was found on a chest x-ray two weeks ago. Her OBGYN gave her hydrocortisone cream for the inflammation.     The following portions of the patient's history were reviewed and updated as appropriate: allergies, current medications, past family history, past medical history, past social history, past surgical history, and problem list.    Review of Systems   Constitutional:  Negative for diaphoresis, unexpected weight gain and unexpected weight loss.   HENT:  Negative for hearing loss, trouble swallowing and voice change.    Eyes:  Negative for visual disturbance.   Respiratory:  Negative for apnea, cough, chest tightness, shortness of breath and wheezing.    Cardiovascular:  Negative for chest pain, palpitations and leg swelling.   Gastrointestinal:  Positive for abdominal distention, constipation and rectal pain. Negative for abdominal pain, anal bleeding, blood in stool, diarrhea, nausea, vomiting, GERD and indigestion.   Endocrine: Negative for cold intolerance and heat intolerance.   Genitourinary:  Negative for difficulty urinating, dysuria and flank pain.   Musculoskeletal:  Negative for back pain and gait problem.   Skin:  Negative for color change, rash, skin lesions and wound.   Neurological:  Negative for dizziness, syncope, speech difficulty, weakness, light-headedness and numbness.   Hematological:  Negative for adenopathy. Does not bruise/bleed easily.   Psychiatric/Behavioral:  The patient is not nervous/anxious.        Objective   Physical  Exam      Assessment & Plan   Diagnoses and all orders for this visit:    1. Chronic idiopathic constipation (Primary)    2. External thrombosed hemorrhoids

## 2024-12-09 DIAGNOSIS — K56.41 FECAL IMPACTION OF COLON: Primary | ICD-10-CM

## 2024-12-10 RX ORDER — ONDANSETRON 4 MG/1
4 TABLET, ORALLY DISINTEGRATING ORAL EVERY 6 HOURS PRN
Qty: 20 TABLET | Refills: 0 | Status: SHIPPED | OUTPATIENT
Start: 2024-12-10

## 2024-12-11 ENCOUNTER — HOSPITAL ENCOUNTER (OUTPATIENT)
Dept: CT IMAGING | Facility: HOSPITAL | Age: 24
Discharge: HOME | End: 2024-12-11
Admitting: FAMILY MEDICINE
Payer: COMMERCIAL

## 2024-12-11 ENCOUNTER — DOCUMENTATION (OUTPATIENT)
Dept: MRI IMAGING | Facility: HOSPITAL | Age: 24
End: 2024-12-11
Payer: COMMERCIAL

## 2024-12-11 ENCOUNTER — TELEPHONE (OUTPATIENT)
Dept: FAMILY MEDICINE CLINIC | Facility: CLINIC | Age: 24
End: 2024-12-11
Payer: COMMERCIAL

## 2024-12-11 DIAGNOSIS — R19.03 RIGHT LOWER QUADRANT ABDOMINAL MASS: ICD-10-CM

## 2024-12-11 DIAGNOSIS — R19.03 RIGHT LOWER QUADRANT ABDOMINAL MASS: Primary | ICD-10-CM

## 2024-12-11 DIAGNOSIS — R10.84 GENERALIZED ABDOMINAL PAIN: ICD-10-CM

## 2024-12-11 PROCEDURE — 25510000001 IOPAMIDOL 61 % SOLUTION: Performed by: FAMILY MEDICINE

## 2024-12-11 PROCEDURE — 25510000002 DIATRIZOATE MEGLUMINE & SODIUM PER 1 ML: Performed by: FAMILY MEDICINE

## 2024-12-11 PROCEDURE — 74178 CT ABD&PLV WO CNTR FLWD CNTR: CPT

## 2024-12-11 RX ORDER — IOPAMIDOL 612 MG/ML
100 INJECTION, SOLUTION INTRAVASCULAR
Status: COMPLETED | OUTPATIENT
Start: 2024-12-11 | End: 2024-12-11

## 2024-12-11 RX ORDER — DIATRIZOATE MEGLUMINE AND DIATRIZOATE SODIUM 660; 100 MG/ML; MG/ML
30 SOLUTION ORAL; RECTAL
Status: COMPLETED | OUTPATIENT
Start: 2024-12-11 | End: 2024-12-11

## 2024-12-11 RX ADMIN — IOPAMIDOL 100 ML: 612 INJECTION, SOLUTION INTRAVENOUS at 19:49

## 2024-12-11 RX ADMIN — DIATRIZOATE MEGLUMINE AND DIATRIZOATE SODIUM 30 ML: 660; 100 LIQUID ORAL; RECTAL at 19:49

## 2024-12-11 NOTE — TELEPHONE ENCOUNTER
Patient called in asking the dates of her previous xrays. Gave her the dates of all of her xrays done at this office

## 2024-12-16 ENCOUNTER — PRIOR AUTHORIZATION (OUTPATIENT)
Age: 24
End: 2024-12-16
Payer: COMMERCIAL

## 2024-12-16 ENCOUNTER — TELEPHONE (OUTPATIENT)
Age: 24
End: 2024-12-16
Payer: COMMERCIAL

## 2024-12-16 DIAGNOSIS — F90.0 ADHD (ATTENTION DEFICIT HYPERACTIVITY DISORDER), INATTENTIVE TYPE: Primary | ICD-10-CM

## 2024-12-16 RX ORDER — LISDEXAMFETAMINE DIMESYLATE 30 MG/1
30 TABLET, CHEWABLE ORAL DAILY
Qty: 30 TABLET | Refills: 0 | Status: SHIPPED | OUTPATIENT
Start: 2024-12-16

## 2024-12-16 NOTE — TELEPHONE ENCOUNTER
Spoke with patient; and some issues with availability of Adderall, and given that she has had some recent GI problems, it may be beneficial to avoid capsules.  We will send prescription of Vyvanse 30 mg chewable, and follow up at her next appointment.

## 2025-01-06 DIAGNOSIS — F41.1 GENERALIZED ANXIETY DISORDER: ICD-10-CM

## 2025-01-08 ENCOUNTER — TELEPHONE (OUTPATIENT)
Age: 25
End: 2025-01-08
Payer: COMMERCIAL

## 2025-01-08 DIAGNOSIS — F90.0 ADHD (ATTENTION DEFICIT HYPERACTIVITY DISORDER), INATTENTIVE TYPE: ICD-10-CM

## 2025-01-08 DIAGNOSIS — F41.1 GENERALIZED ANXIETY DISORDER: Primary | ICD-10-CM

## 2025-01-08 RX ORDER — DESVENLAFAXINE 50 MG/1
50 TABLET, FILM COATED, EXTENDED RELEASE ORAL DAILY
Qty: 30 TABLET | Refills: 2 | Status: SHIPPED | OUTPATIENT
Start: 2025-01-08

## 2025-01-08 RX ORDER — LISDEXAMFETAMINE DIMESYLATE 40 MG/1
40 TABLET, CHEWABLE ORAL DAILY
Qty: 30 TABLET | Refills: 0 | Status: SHIPPED | OUTPATIENT
Start: 2025-01-17

## 2025-01-08 NOTE — TELEPHONE ENCOUNTER
Patient called stating that with her new insurance her rx of Fetzima is still over $200. Patient was wanting to see if Paola could check and see if there are any other medications similar to Fetzima that she can be prescribed that would be a cheaper alternative

## 2025-01-08 NOTE — TELEPHONE ENCOUNTER
Called patient back; with insurance change, Fetzima is no longer affordable, but she is open to trying Pristiq, which is in the same drug class.  She also notes that the Vyvanse seems to be working really well, but it does not seem to last long enough; we will increase Vyvanse dose to 40 mg, and have patient schedule a follow-up appointment to reevaluate both medications.

## 2025-01-09 RX ORDER — FLUCONAZOLE 150 MG/1
150 TABLET ORAL
Qty: 3 TABLET | Refills: 0 | Status: SHIPPED | OUTPATIENT
Start: 2025-01-09

## 2025-01-17 ENCOUNTER — APPOINTMENT (OUTPATIENT)
Dept: OTHER | Facility: HOSPITAL | Age: 25
End: 2025-01-17
Payer: COMMERCIAL

## 2025-01-17 ENCOUNTER — HOSPITAL ENCOUNTER (OUTPATIENT)
Dept: ULTRASOUND IMAGING | Facility: HOSPITAL | Age: 25
Discharge: HOME | End: 2025-01-17
Payer: COMMERCIAL

## 2025-01-17 DIAGNOSIS — D24.9: ICD-10-CM

## 2025-01-17 DIAGNOSIS — Z00.6 EXAMINATION FOR NORMAL COMPARISON FOR CLINICAL RESEARCH: ICD-10-CM

## 2025-01-17 PROCEDURE — 76642 ULTRASOUND BREAST LIMITED: CPT

## 2025-01-21 NOTE — PROGRESS NOTES
Subjective   Ivett Patrick is a 24 y.o. female.   Chief Complaint   Patient presents with    Mass     Abnormal Breast Ultrasound        History of Present Illness    Ivett returns to the office today for evaluation of a palpable mass of the left breast, for which she recently underwent an ultrasound for further investigation.  This has been present for several years, but has been largely asymptomatic.  In 2021, she underwent ultrasound for evaluation of the same palpable mass at which time it measured 3.1 cm, and was felt to represent benign etiology.  An outside ultrasound performed in 2023, the mass was noted to have decreased in size.  No the mass in question was noted to measure blood flow to the mass was noted on the prior outside studies.  On her most recent follow-up ultrasound evaluation, 3.6 cm in size and was noted to have some vascular flow.  The mass was noted to be at the 1 to 2 o'clock position, and was oval, hypoechoic, and solid.  The mass was felt to represent a suspicious abnormality, and surgical excision was recommended.        The following portions of the patient's history were reviewed and updated as appropriate: allergies, current medications, past family history, past medical history, past social history, past surgical history, and problem list.      Patient Active Problem List   Diagnosis    Edema leg    Shortness of breath    Heart murmur    Pain in both lower extremities    Palpitations    Hypotension    ADHD (attention deficit hyperactivity disorder), inattentive type    Generalized anxiety disorder    Therapeutic drug monitoring    Nightmares    Mass of left breast       Past Medical History:   Diagnosis Date    ADHD (attention deficit hyperactivity disorder)     Anxiety     Depression     Healthy adolescent     Hx of tonsillectomy 2008    PTSD (post-traumatic stress disorder)        Past Surgical History:   Procedure Laterality Date    CONVERTED (HISTORICAL) HOLTER  05/17/2021    <7 Days.  AVG 82. . Rare PAC & PVC    ECHO - CONVERTED  05/19/2021    EF 65%. Trace MR. RVSP- 18 mmHg. No shunt    TONSILLECTOMY  2008       Medications:   No current facility-administered medications on file prior to visit.     Current Outpatient Medications on File Prior to Visit   Medication Sig    busPIRone (BUSPAR) 10 MG tablet Take 1 tablet by mouth 2 (Two) Times a Day.    desvenlafaxine (PRISTIQ) 50 MG 24 hr tablet Take 1 tablet by mouth Daily.    Drospirenone-Estetrol (Nextstellis) 3-14.2 MG tablet Take 1 tablet by mouth Daily.    Drospirenone-Estetrol (Nextstellis) 3-14.2 MG tablet Take 1 tablet by mouth once daily    Drospirenone-Estetrol (Nextstellis) 3-14.2 MG tablet Take 1 tablet by mouth once daily    linaclotide (Linzess) 290 MCG capsule capsule Take 1 capsule by mouth Every Morning Before Breakfast.    ondansetron ODT (ZOFRAN-ODT) 4 MG disintegrating tablet Place 1 tablet on the tongue Every 6 (Six) Hours As Needed for Nausea or Vomiting.    prazosin (MINIPRESS) 1 MG capsule Take 1 to 3 capsules nightly for nightmares.         Allergies:   Allergies   Allergen Reactions    Latex Hives    Penicillins Nausea And Vomiting     Beta lactam allergy details  Antibiotic reaction: unknown  Age at reaction: infant  Dose to reaction time: unknown  Reason for antibiotic: unknown  Epinephrine required for reaction?: unknown  Tolerated antibiotics: unknown         Amoxicillin Nausea And Vomiting and Rash     rash         Family History   Problem Relation Age of Onset    Hypotension Mother     Fibromyalgia Mother     ADD / ADHD Mother     Anxiety disorder Mother     Depression Mother     No Known Problems Father     Fibromyalgia Sister     Heart murmur Sister     Celiac disease Sister     ADD / ADHD Sister     Anxiety disorder Sister     Depression Sister     Hypertension Maternal Grandmother     Clotting disorder Maternal Grandmother     Cancer Maternal Grandfather     Hypertension Maternal Grandfather     Lung cancer  "Maternal Grandfather     Diabetes Paternal Grandmother     Heart attack Paternal Grandfather         3 open heart surgery    Stroke Paternal Grandfather     Lung disease Paternal Grandfather     Hypertension Paternal Grandfather        Social History     Socioeconomic History    Marital status: Single   Tobacco Use    Smoking status: Never     Passive exposure: Never    Smokeless tobacco: Never   Vaping Use    Vaping status: Never Used   Substance and Sexual Activity    Alcohol use: No    Drug use: Never    Sexual activity: Yes     Partners: Male     Birth control/protection: Birth control pill       Review of Systems   Constitutional:  Negative for diaphoresis, unexpected weight gain and unexpected weight loss.   HENT:  Negative for hearing loss, trouble swallowing and voice change.    Eyes:  Negative for visual disturbance.   Respiratory:  Negative for apnea, cough, chest tightness, shortness of breath and wheezing.    Cardiovascular:  Negative for chest pain, palpitations and leg swelling.   Gastrointestinal:  Negative for abdominal distention, abdominal pain, anal bleeding, blood in stool, constipation, diarrhea, nausea, rectal pain, vomiting, GERD and indigestion.   Endocrine: Negative for cold intolerance and heat intolerance.   Genitourinary:  Positive for breast lump. Negative for difficulty urinating, dysuria and flank pain.   Musculoskeletal:  Negative for back pain and gait problem.   Skin:  Negative for color change, rash, skin lesions and wound.   Neurological:  Negative for dizziness, syncope, speech difficulty, weakness, light-headedness and numbness.   Hematological:  Negative for adenopathy. Does not bruise/bleed easily.   Psychiatric/Behavioral:  The patient is not nervous/anxious.        Objective   /66   Pulse 85   Temp 98 °F (36.7 °C) (Temporal)   Resp 12   Ht 152.4 cm (60\")   Wt 68.5 kg (151 lb)   SpO2 97%   BMI 29.49 kg/m²     Physical Exam  Constitutional:       Appearance: She " is well-developed.   HENT:      Head: Normocephalic and atraumatic.   Cardiovascular:      Rate and Rhythm: Regular rhythm.   Pulmonary:      Effort: Pulmonary effort is normal.   Skin:     General: Skin is warm and dry.   Neurological:      Mental Status: She is alert and oriented to person, place, and time.   Psychiatric:         Behavior: Behavior normal.         Outside Records:  I have taken the opportunity to review the patient's available outside records in paper format, scanned format and those available on Care Everywhere    Results Review:  I have also personally reviewed the relevant patient imaging.     Narrative & Impression   ULTRASOUND LEFT BREAST     CLINICAL INDICATION: Patient has had a palpable mass in the left breast  at the 1 to 2 o'clock position. She had an ultrasound at an outside  facility August 11, 2021 and this demonstrated an oval, hypoechoic 3.1  cm solid lesion. Patient was 33 weeks pregnant at the time. Patient  returned for follow-up April 4, 2023 in the solid mass had decreased to  1 cm in length.     TECHNIQUE: Directed sonographic evaluation left breast     COMPARISON: Previous exams back to August 11, 2021.     FINDINGS:   Palpable abnormality at the 1 to 2 o'clock position  correlates with an oval, hypoechoic, solid, mildly vascular mass  measuring up to 3.6 cm.  Given the significant interval increase in size compared to prior study  of April 4, 2023, recommend surgical excision. Patient is in agreement.     IMPRESSION:  Significant enlargement in the palpable mass at the 1 to 2  o'clock position left breast from 1 cm in April 2023 to 3.6 cm on this  exam; recommend surgical excision.     BI-RADS CATEGORY: 4 , SUSPICIOUS ABNORMALITY.     RECOMMENDED FOLLOW-UP: Surgical excision.           NOTES: Mammography does not detect approximately 10-15% of breast  cancers. Physical examination of the breasts by a physician and regular  monthly breast self examinations are integral parts  of breast cancer  screening.       A normal breast imaging exam does not exclude breast cancer if there is  an abnormal finding on physical examination. When clinically indicated,  a biopsy should not be postponed because of a normal breast imaging  report.          Please allow this report to serve as a order for additional imaging  follow-up        [place]. [address]     NOTE: If a biopsy is performed on this patient, a copy of the pathology  report would be appreciated.        This report was signed and finalized on 1/17/2025 11:40 AM by Jadyn Cao MD.          Assessment & Plan   Diagnoses and all orders for this visit:    1. Mass of left breast, unspecified quadrant (Primary)      Ms. Patrick is a 24-year-old female patient with enlarging palpable solid mass of the left breast at the 1 to 2 o'clock position as seen on recent ultrasound.  This is approaching 4 cm in diameter, and surgical excision is certainly indicated.  Although this most likely represents a benign fibroadenoma, there is certainly benefit to definitive pathologic evaluation with excision.  I discussed this in detail with the patient, and answered her questions related to the procedure.  We discussed the risks, benefits, and alternatives to the proposed surgical procedure, including the risk of recurrence.  She understands, and wishes to move forward with an excisional breast biopsy as recommended.  She understands that this will remove the entirety of the mass, but will not involve removing any significant margin of breast tissue.  She understands the potential risk of requiring additional procedures, risk of bleeding, infection, pain, need for additional procedures, and the risks related to anesthesia.  She understands that she will need to be n.p.o. after midnight the evening prior to the scheduled surgical procedure.  My office will assist her making arrangements for scheduling, and  preadmission testing.

## 2025-01-21 NOTE — H&P (VIEW-ONLY)
Subjective   Ivett Patrick is a 24 y.o. female.   Chief Complaint   Patient presents with    Mass     Abnormal Breast Ultrasound        History of Present Illness    Ivett returns to the office today for evaluation of a palpable mass of the left breast, for which she recently underwent an ultrasound for further investigation.  This has been present for several years, but has been largely asymptomatic.  In 2021, she underwent ultrasound for evaluation of the same palpable mass at which time it measured 3.1 cm, and was felt to represent benign etiology.  An outside ultrasound performed in 2023, the mass was noted to have decreased in size.  No the mass in question was noted to measure blood flow to the mass was noted on the prior outside studies.  On her most recent follow-up ultrasound evaluation, 3.6 cm in size and was noted to have some vascular flow.  The mass was noted to be at the 1 to 2 o'clock position, and was oval, hypoechoic, and solid.  The mass was felt to represent a suspicious abnormality, and surgical excision was recommended.        The following portions of the patient's history were reviewed and updated as appropriate: allergies, current medications, past family history, past medical history, past social history, past surgical history, and problem list.      Patient Active Problem List   Diagnosis    Edema leg    Shortness of breath    Heart murmur    Pain in both lower extremities    Palpitations    Hypotension    ADHD (attention deficit hyperactivity disorder), inattentive type    Generalized anxiety disorder    Therapeutic drug monitoring    Nightmares    Mass of left breast       Past Medical History:   Diagnosis Date    ADHD (attention deficit hyperactivity disorder)     Anxiety     Depression     Healthy adolescent     Hx of tonsillectomy 2008    PTSD (post-traumatic stress disorder)        Past Surgical History:   Procedure Laterality Date    CONVERTED (HISTORICAL) HOLTER  05/17/2021    <7 Days.  AVG 82. . Rare PAC & PVC    ECHO - CONVERTED  05/19/2021    EF 65%. Trace MR. RVSP- 18 mmHg. No shunt    TONSILLECTOMY  2008       Medications:   No current facility-administered medications on file prior to visit.     Current Outpatient Medications on File Prior to Visit   Medication Sig    busPIRone (BUSPAR) 10 MG tablet Take 1 tablet by mouth 2 (Two) Times a Day.    desvenlafaxine (PRISTIQ) 50 MG 24 hr tablet Take 1 tablet by mouth Daily.    Drospirenone-Estetrol (Nextstellis) 3-14.2 MG tablet Take 1 tablet by mouth Daily.    Drospirenone-Estetrol (Nextstellis) 3-14.2 MG tablet Take 1 tablet by mouth once daily    Drospirenone-Estetrol (Nextstellis) 3-14.2 MG tablet Take 1 tablet by mouth once daily    linaclotide (Linzess) 290 MCG capsule capsule Take 1 capsule by mouth Every Morning Before Breakfast.    ondansetron ODT (ZOFRAN-ODT) 4 MG disintegrating tablet Place 1 tablet on the tongue Every 6 (Six) Hours As Needed for Nausea or Vomiting.    prazosin (MINIPRESS) 1 MG capsule Take 1 to 3 capsules nightly for nightmares.         Allergies:   Allergies   Allergen Reactions    Latex Hives    Penicillins Nausea And Vomiting     Beta lactam allergy details  Antibiotic reaction: unknown  Age at reaction: infant  Dose to reaction time: unknown  Reason for antibiotic: unknown  Epinephrine required for reaction?: unknown  Tolerated antibiotics: unknown         Amoxicillin Nausea And Vomiting and Rash     rash         Family History   Problem Relation Age of Onset    Hypotension Mother     Fibromyalgia Mother     ADD / ADHD Mother     Anxiety disorder Mother     Depression Mother     No Known Problems Father     Fibromyalgia Sister     Heart murmur Sister     Celiac disease Sister     ADD / ADHD Sister     Anxiety disorder Sister     Depression Sister     Hypertension Maternal Grandmother     Clotting disorder Maternal Grandmother     Cancer Maternal Grandfather     Hypertension Maternal Grandfather     Lung cancer  "Maternal Grandfather     Diabetes Paternal Grandmother     Heart attack Paternal Grandfather         3 open heart surgery    Stroke Paternal Grandfather     Lung disease Paternal Grandfather     Hypertension Paternal Grandfather        Social History     Socioeconomic History    Marital status: Single   Tobacco Use    Smoking status: Never     Passive exposure: Never    Smokeless tobacco: Never   Vaping Use    Vaping status: Never Used   Substance and Sexual Activity    Alcohol use: No    Drug use: Never    Sexual activity: Yes     Partners: Male     Birth control/protection: Birth control pill       Review of Systems   Constitutional:  Negative for diaphoresis, unexpected weight gain and unexpected weight loss.   HENT:  Negative for hearing loss, trouble swallowing and voice change.    Eyes:  Negative for visual disturbance.   Respiratory:  Negative for apnea, cough, chest tightness, shortness of breath and wheezing.    Cardiovascular:  Negative for chest pain, palpitations and leg swelling.   Gastrointestinal:  Negative for abdominal distention, abdominal pain, anal bleeding, blood in stool, constipation, diarrhea, nausea, rectal pain, vomiting, GERD and indigestion.   Endocrine: Negative for cold intolerance and heat intolerance.   Genitourinary:  Positive for breast lump. Negative for difficulty urinating, dysuria and flank pain.   Musculoskeletal:  Negative for back pain and gait problem.   Skin:  Negative for color change, rash, skin lesions and wound.   Neurological:  Negative for dizziness, syncope, speech difficulty, weakness, light-headedness and numbness.   Hematological:  Negative for adenopathy. Does not bruise/bleed easily.   Psychiatric/Behavioral:  The patient is not nervous/anxious.        Objective   /66   Pulse 85   Temp 98 °F (36.7 °C) (Temporal)   Resp 12   Ht 152.4 cm (60\")   Wt 68.5 kg (151 lb)   SpO2 97%   BMI 29.49 kg/m²     Physical Exam  Constitutional:       Appearance: She " is well-developed.   HENT:      Head: Normocephalic and atraumatic.   Cardiovascular:      Rate and Rhythm: Regular rhythm.   Pulmonary:      Effort: Pulmonary effort is normal.   Skin:     General: Skin is warm and dry.   Neurological:      Mental Status: She is alert and oriented to person, place, and time.   Psychiatric:         Behavior: Behavior normal.         Outside Records:  I have taken the opportunity to review the patient's available outside records in paper format, scanned format and those available on Care Everywhere    Results Review:  I have also personally reviewed the relevant patient imaging.     Narrative & Impression   ULTRASOUND LEFT BREAST     CLINICAL INDICATION: Patient has had a palpable mass in the left breast  at the 1 to 2 o'clock position. She had an ultrasound at an outside  facility August 11, 2021 and this demonstrated an oval, hypoechoic 3.1  cm solid lesion. Patient was 33 weeks pregnant at the time. Patient  returned for follow-up April 4, 2023 in the solid mass had decreased to  1 cm in length.     TECHNIQUE: Directed sonographic evaluation left breast     COMPARISON: Previous exams back to August 11, 2021.     FINDINGS:   Palpable abnormality at the 1 to 2 o'clock position  correlates with an oval, hypoechoic, solid, mildly vascular mass  measuring up to 3.6 cm.  Given the significant interval increase in size compared to prior study  of April 4, 2023, recommend surgical excision. Patient is in agreement.     IMPRESSION:  Significant enlargement in the palpable mass at the 1 to 2  o'clock position left breast from 1 cm in April 2023 to 3.6 cm on this  exam; recommend surgical excision.     BI-RADS CATEGORY: 4 , SUSPICIOUS ABNORMALITY.     RECOMMENDED FOLLOW-UP: Surgical excision.           NOTES: Mammography does not detect approximately 10-15% of breast  cancers. Physical examination of the breasts by a physician and regular  monthly breast self examinations are integral parts  of breast cancer  screening.       A normal breast imaging exam does not exclude breast cancer if there is  an abnormal finding on physical examination. When clinically indicated,  a biopsy should not be postponed because of a normal breast imaging  report.          Please allow this report to serve as a order for additional imaging  follow-up        [place]. [address]     NOTE: If a biopsy is performed on this patient, a copy of the pathology  report would be appreciated.        This report was signed and finalized on 1/17/2025 11:40 AM by Jadyn Cao MD.          Assessment & Plan   Diagnoses and all orders for this visit:    1. Mass of left breast, unspecified quadrant (Primary)      Ms. Patrick is a 24-year-old female patient with enlarging palpable solid mass of the left breast at the 1 to 2 o'clock position as seen on recent ultrasound.  This is approaching 4 cm in diameter, and surgical excision is certainly indicated.  Although this most likely represents a benign fibroadenoma, there is certainly benefit to definitive pathologic evaluation with excision.  I discussed this in detail with the patient, and answered her questions related to the procedure.  We discussed the risks, benefits, and alternatives to the proposed surgical procedure, including the risk of recurrence.  She understands, and wishes to move forward with an excisional breast biopsy as recommended.  She understands that this will remove the entirety of the mass, but will not involve removing any significant margin of breast tissue.  She understands the potential risk of requiring additional procedures, risk of bleeding, infection, pain, need for additional procedures, and the risks related to anesthesia.  She understands that she will need to be n.p.o. after midnight the evening prior to the scheduled surgical procedure.  My office will assist her making arrangements for scheduling, and  preadmission testing.

## 2025-01-22 ENCOUNTER — OFFICE VISIT (OUTPATIENT)
Dept: SURGERY | Facility: CLINIC | Age: 25
End: 2025-01-22
Payer: COMMERCIAL

## 2025-01-22 VITALS
SYSTOLIC BLOOD PRESSURE: 106 MMHG | TEMPERATURE: 98 F | WEIGHT: 151 LBS | BODY MASS INDEX: 29.64 KG/M2 | DIASTOLIC BLOOD PRESSURE: 66 MMHG | OXYGEN SATURATION: 97 % | HEART RATE: 85 BPM | HEIGHT: 60 IN | RESPIRATION RATE: 12 BRPM

## 2025-01-22 DIAGNOSIS — N63.20 MASS OF LEFT BREAST, UNSPECIFIED QUADRANT: Primary | ICD-10-CM

## 2025-01-22 PROCEDURE — 99214 OFFICE O/P EST MOD 30 MIN: CPT | Performed by: SURGERY

## 2025-01-24 ENCOUNTER — PREP FOR SURGERY (OUTPATIENT)
Dept: OTHER | Facility: HOSPITAL | Age: 25
End: 2025-01-24
Payer: COMMERCIAL

## 2025-01-24 DIAGNOSIS — N63.20 MASS OF LEFT BREAST, UNSPECIFIED QUADRANT: Primary | ICD-10-CM

## 2025-01-24 RX ORDER — SODIUM CHLORIDE 9 MG/ML
40 INJECTION, SOLUTION INTRAVENOUS AS NEEDED
OUTPATIENT
Start: 2025-01-24

## 2025-01-24 RX ORDER — SODIUM CHLORIDE 0.9 % (FLUSH) 0.9 %
10 SYRINGE (ML) INJECTION EVERY 12 HOURS SCHEDULED
OUTPATIENT
Start: 2025-01-24

## 2025-01-24 RX ORDER — SODIUM CHLORIDE, SODIUM LACTATE, POTASSIUM CHLORIDE, CALCIUM CHLORIDE 600; 310; 30; 20 MG/100ML; MG/100ML; MG/100ML; MG/100ML
50 INJECTION, SOLUTION INTRAVENOUS CONTINUOUS
OUTPATIENT
Start: 2025-01-24 | End: 2025-01-25

## 2025-01-24 RX ORDER — SODIUM CHLORIDE 0.9 % (FLUSH) 0.9 %
10 SYRINGE (ML) INJECTION AS NEEDED
OUTPATIENT
Start: 2025-01-24

## 2025-01-24 RX ORDER — HEPARIN SODIUM 5000 [USP'U]/ML
5000 INJECTION, SOLUTION INTRAVENOUS; SUBCUTANEOUS ONCE
OUTPATIENT
Start: 2025-01-24 | End: 2025-01-24

## 2025-01-27 DIAGNOSIS — F90.0 ADHD (ATTENTION DEFICIT HYPERACTIVITY DISORDER), INATTENTIVE TYPE: Primary | ICD-10-CM

## 2025-01-27 RX ORDER — DEXTROAMPHETAMINE SACCHARATE, AMPHETAMINE ASPARTATE MONOHYDRATE, DEXTROAMPHETAMINE SULFATE AND AMPHETAMINE SULFATE 5; 5; 5; 5 MG/1; MG/1; MG/1; MG/1
20 CAPSULE, EXTENDED RELEASE ORAL DAILY
Qty: 30 CAPSULE | Refills: 0 | Status: SHIPPED | OUTPATIENT
Start: 2025-01-27

## 2025-01-27 NOTE — PROGRESS NOTES
Patient called to inform that cost of Vyvanse was too much for her budget, and wanted to be switched back to extended release Adderall.  Prescription sent.

## 2025-01-30 ENCOUNTER — DOCUMENTATION (OUTPATIENT)
Dept: FAMILY MEDICINE CLINIC | Facility: CLINIC | Age: 25
End: 2025-01-30
Payer: COMMERCIAL

## 2025-01-30 RX ORDER — MECLIZINE HYDROCHLORIDE 25 MG/1
25 TABLET ORAL 3 TIMES DAILY PRN
Status: CANCELLED | OUTPATIENT
Start: 2025-01-30

## 2025-01-31 ENCOUNTER — PREP FOR SURGERY (OUTPATIENT)
Dept: OTHER | Facility: HOSPITAL | Age: 25
End: 2025-01-31
Payer: COMMERCIAL

## 2025-01-31 RX ORDER — MECLIZINE HYDROCHLORIDE 25 MG/1
25 TABLET ORAL 3 TIMES DAILY PRN
Qty: 20 TABLET | Refills: 0 | Status: SHIPPED | OUTPATIENT
Start: 2025-01-31

## 2025-02-03 ENCOUNTER — ANESTHESIA (OUTPATIENT)
Dept: PERIOP | Facility: HOSPITAL | Age: 25
End: 2025-02-03
Payer: COMMERCIAL

## 2025-02-03 ENCOUNTER — ANESTHESIA EVENT (OUTPATIENT)
Dept: PERIOP | Facility: HOSPITAL | Age: 25
End: 2025-02-03
Payer: COMMERCIAL

## 2025-02-03 ENCOUNTER — HOSPITAL ENCOUNTER (OUTPATIENT)
Facility: HOSPITAL | Age: 25
Setting detail: HOSPITAL OUTPATIENT SURGERY
Discharge: HOME | End: 2025-02-03
Attending: SURGERY | Admitting: SURGERY
Payer: COMMERCIAL

## 2025-02-03 VITALS
TEMPERATURE: 97.5 F | RESPIRATION RATE: 20 BRPM | DIASTOLIC BLOOD PRESSURE: 78 MMHG | OXYGEN SATURATION: 100 % | SYSTOLIC BLOOD PRESSURE: 115 MMHG | HEART RATE: 65 BPM

## 2025-02-03 DIAGNOSIS — N63.21 MASS OF UPPER OUTER QUADRANT OF LEFT BREAST: Primary | ICD-10-CM

## 2025-02-03 DIAGNOSIS — N63.20 MASS OF LEFT BREAST, UNSPECIFIED QUADRANT: ICD-10-CM

## 2025-02-03 LAB
B-HCG UR QL: NEGATIVE
EXPIRATION DATE: NORMAL
INTERNAL NEGATIVE CONTROL: NORMAL
INTERNAL POSITIVE CONTROL: NORMAL
Lab: NORMAL

## 2025-02-03 PROCEDURE — 25010000002 LIDOCAINE (CARDIAC): Performed by: NURSE ANESTHETIST, CERTIFIED REGISTERED

## 2025-02-03 PROCEDURE — 25010000002 CEFAZOLIN PER 500 MG: Performed by: SURGERY

## 2025-02-03 PROCEDURE — 25010000002 PROPOFOL 10 MG/ML EMULSION: Performed by: NURSE ANESTHETIST, CERTIFIED REGISTERED

## 2025-02-03 PROCEDURE — 25010000002 LIDOCAINE 1 % SOLUTION: Performed by: SURGERY

## 2025-02-03 PROCEDURE — 25010000002 MIDAZOLAM PER 1MG: Performed by: NURSE ANESTHETIST, CERTIFIED REGISTERED

## 2025-02-03 PROCEDURE — 25010000002 HEPARIN (PORCINE) PER 1000 UNITS: Performed by: SURGERY

## 2025-02-03 PROCEDURE — 25010000002 FENTANYL CITRATE (PF) 50 MCG/ML SOLUTION PREFILLED SYRINGE: Performed by: NURSE ANESTHETIST, CERTIFIED REGISTERED

## 2025-02-03 PROCEDURE — 25010000002 ONDANSETRON PER 1 MG: Performed by: NURSE ANESTHETIST, CERTIFIED REGISTERED

## 2025-02-03 PROCEDURE — 25010000002 KETOROLAC TROMETHAMINE PER 15 MG: Performed by: NURSE ANESTHETIST, CERTIFIED REGISTERED

## 2025-02-03 PROCEDURE — 25010000002 DEXAMETHASONE PER 1 MG: Performed by: NURSE ANESTHETIST, CERTIFIED REGISTERED

## 2025-02-03 PROCEDURE — 81025 URINE PREGNANCY TEST: CPT | Performed by: SURGERY

## 2025-02-03 PROCEDURE — 25010000002 BUPIVACAINE (PF) 0.25 % SOLUTION: Performed by: SURGERY

## 2025-02-03 RX ORDER — HYDROCODONE BITARTRATE AND ACETAMINOPHEN 7.5; 325 MG/1; MG/1
1 TABLET ORAL EVERY 4 HOURS PRN
Qty: 10 TABLET | Refills: 0 | Status: SHIPPED | OUTPATIENT
Start: 2025-02-03 | End: 2025-02-11

## 2025-02-03 RX ORDER — SODIUM CHLORIDE 9 MG/ML
40 INJECTION, SOLUTION INTRAVENOUS AS NEEDED
Status: DISCONTINUED | OUTPATIENT
Start: 2025-02-03 | End: 2025-02-03 | Stop reason: HOSPADM

## 2025-02-03 RX ORDER — PROPOFOL 10 MG/ML
VIAL (ML) INTRAVENOUS AS NEEDED
Status: DISCONTINUED | OUTPATIENT
Start: 2025-02-03 | End: 2025-02-03 | Stop reason: SURG

## 2025-02-03 RX ORDER — MIDAZOLAM HYDROCHLORIDE 2 MG/2ML
INJECTION, SOLUTION INTRAMUSCULAR; INTRAVENOUS AS NEEDED
Status: DISCONTINUED | OUTPATIENT
Start: 2025-02-03 | End: 2025-02-03 | Stop reason: SURG

## 2025-02-03 RX ORDER — SODIUM CHLORIDE, SODIUM LACTATE, POTASSIUM CHLORIDE, CALCIUM CHLORIDE 600; 310; 30; 20 MG/100ML; MG/100ML; MG/100ML; MG/100ML
50 INJECTION, SOLUTION INTRAVENOUS CONTINUOUS
Status: DISCONTINUED | OUTPATIENT
Start: 2025-02-03 | End: 2025-02-03 | Stop reason: HOSPADM

## 2025-02-03 RX ORDER — BUPIVACAINE HYDROCHLORIDE 2.5 MG/ML
INJECTION, SOLUTION EPIDURAL; INFILTRATION; INTRACAUDAL AS NEEDED
Status: DISCONTINUED | OUTPATIENT
Start: 2025-02-03 | End: 2025-02-03 | Stop reason: HOSPADM

## 2025-02-03 RX ORDER — ONDANSETRON 2 MG/ML
4 INJECTION INTRAMUSCULAR; INTRAVENOUS ONCE AS NEEDED
Status: DISCONTINUED | OUTPATIENT
Start: 2025-02-03 | End: 2025-02-03 | Stop reason: HOSPADM

## 2025-02-03 RX ORDER — ONDANSETRON 2 MG/ML
INJECTION INTRAMUSCULAR; INTRAVENOUS AS NEEDED
Status: DISCONTINUED | OUTPATIENT
Start: 2025-02-03 | End: 2025-02-03 | Stop reason: SURG

## 2025-02-03 RX ORDER — FENTANYL CITRATE 50 UG/ML
INJECTION, SOLUTION INTRAMUSCULAR; INTRAVENOUS AS NEEDED
Status: DISCONTINUED | OUTPATIENT
Start: 2025-02-03 | End: 2025-02-03 | Stop reason: SURG

## 2025-02-03 RX ORDER — DEXAMETHASONE SODIUM PHOSPHATE 4 MG/ML
INJECTION, SOLUTION INTRA-ARTICULAR; INTRALESIONAL; INTRAMUSCULAR; INTRAVENOUS; SOFT TISSUE AS NEEDED
Status: DISCONTINUED | OUTPATIENT
Start: 2025-02-03 | End: 2025-02-03 | Stop reason: SURG

## 2025-02-03 RX ORDER — SODIUM CHLORIDE 0.9 % (FLUSH) 0.9 %
10 SYRINGE (ML) INJECTION EVERY 12 HOURS SCHEDULED
Status: DISCONTINUED | OUTPATIENT
Start: 2025-02-03 | End: 2025-02-03 | Stop reason: HOSPADM

## 2025-02-03 RX ORDER — KETOROLAC TROMETHAMINE 30 MG/ML
INJECTION, SOLUTION INTRAMUSCULAR; INTRAVENOUS AS NEEDED
Status: DISCONTINUED | OUTPATIENT
Start: 2025-02-03 | End: 2025-02-03 | Stop reason: SURG

## 2025-02-03 RX ORDER — LIDOCAINE HYDROCHLORIDE 10 MG/ML
INJECTION, SOLUTION INFILTRATION; PERINEURAL AS NEEDED
Status: DISCONTINUED | OUTPATIENT
Start: 2025-02-03 | End: 2025-02-03 | Stop reason: HOSPADM

## 2025-02-03 RX ORDER — KETAMINE HCL IN NACL, ISO-OSM 100MG/10ML
SYRINGE (ML) INJECTION AS NEEDED
Status: DISCONTINUED | OUTPATIENT
Start: 2025-02-03 | End: 2025-02-03 | Stop reason: SURG

## 2025-02-03 RX ORDER — HEPARIN SODIUM 5000 [USP'U]/ML
5000 INJECTION, SOLUTION INTRAVENOUS; SUBCUTANEOUS ONCE
Status: COMPLETED | OUTPATIENT
Start: 2025-02-03 | End: 2025-02-03

## 2025-02-03 RX ORDER — IPRATROPIUM BROMIDE AND ALBUTEROL SULFATE 2.5; .5 MG/3ML; MG/3ML
3 SOLUTION RESPIRATORY (INHALATION) ONCE AS NEEDED
Status: DISCONTINUED | OUTPATIENT
Start: 2025-02-03 | End: 2025-02-03 | Stop reason: HOSPADM

## 2025-02-03 RX ORDER — SODIUM CHLORIDE 0.9 % (FLUSH) 0.9 %
10 SYRINGE (ML) INJECTION AS NEEDED
Status: DISCONTINUED | OUTPATIENT
Start: 2025-02-03 | End: 2025-02-03 | Stop reason: HOSPADM

## 2025-02-03 RX ADMIN — Medication 10 MG: at 14:38

## 2025-02-03 RX ADMIN — KETOROLAC TROMETHAMINE 30 MG: 30 INJECTION, SOLUTION INTRAMUSCULAR at 15:14

## 2025-02-03 RX ADMIN — LIDOCAINE HYDROCHLORIDE 60 MG: 20 INJECTION, SOLUTION INTRAVENOUS at 14:27

## 2025-02-03 RX ADMIN — FENTANYL CITRATE 50 MCG: 50 INJECTION, SOLUTION INTRAMUSCULAR; INTRAVENOUS at 14:28

## 2025-02-03 RX ADMIN — SODIUM CHLORIDE 2000 MG: 9 INJECTION, SOLUTION INTRAVENOUS at 14:46

## 2025-02-03 RX ADMIN — DEXAMETHASONE SODIUM PHOSPHATE 4 MG: 4 INJECTION, SOLUTION INTRA-ARTICULAR; INTRALESIONAL; INTRAMUSCULAR; INTRAVENOUS; SOFT TISSUE at 14:52

## 2025-02-03 RX ADMIN — HEPARIN SODIUM 5000 UNITS: 5000 INJECTION, SOLUTION INTRAVENOUS; SUBCUTANEOUS at 14:20

## 2025-02-03 RX ADMIN — PROPOFOL 140 MCG/KG/MIN: 10 INJECTION, EMULSION INTRAVENOUS at 14:27

## 2025-02-03 RX ADMIN — MIDAZOLAM HYDROCHLORIDE 1 MG: 1 INJECTION, SOLUTION INTRAMUSCULAR; INTRAVENOUS at 14:32

## 2025-02-03 RX ADMIN — MIDAZOLAM HYDROCHLORIDE 1 MG: 1 INJECTION, SOLUTION INTRAMUSCULAR; INTRAVENOUS at 14:44

## 2025-02-03 RX ADMIN — PROPOFOL 100 MG: 10 INJECTION, EMULSION INTRAVENOUS at 14:27

## 2025-02-03 RX ADMIN — ONDANSETRON 4 MG: 2 INJECTION INTRAMUSCULAR; INTRAVENOUS at 14:44

## 2025-02-03 NOTE — DISCHARGE INSTRUCTIONS
No pushing, pulling, tugging,  heavy lifting, or strenuous activity.  No major decision making, driving, or drinking alcoholic beverages for 24 hours. ( due to the medications you have  received)  Always use good hand hygiene/washing techniques.  NO driving while taking pain medications.    * if you have an incision:  Check your incision area every day for signs of infection.   Check for:  * more redness, swelling, or pain  *more fluid or blood  *warmth  *pus or bad smell.    To assist you in voiding:  Drink plenty of fluids  Listen to running water while attempting to void.    If you are unable to urinate and you have an uncomfortable urge to void or it has been   6 hours since you were discharged, return to the Emergency Room.    Keep your left arm elevated on a pillow to help decrease swelling and as a comfort measure.      Apply ice to incision site as ordered per physician, remove, and reapply.  Do not use ice continuously.

## 2025-02-03 NOTE — ANESTHESIA PREPROCEDURE EVALUATION
Anesthesia Evaluation     Patient summary reviewed, Nursing notes reviewed and pregnancy test completed   NPO Solid Status: > 8 hours  NPO Liquid Status: > 2 hours           Airway   Mallampati: II  TM distance: >3 FB  Neck ROM: full  Possible difficult intubation  Dental - normal exam     Pulmonary - normal exam   (+) ,shortness of breath  Cardiovascular - normal exam    ECG reviewed  Rhythm: regular  Rate: normal    (+) valvular problems/murmurs murmur and TI, dysrhythmias (palpitations)    ROS comment: ·  4/23/2021 ECHO     Normal left ventricular cavity size and wall thickness noted.  · Left ventricular ejection fraction appears to be 61 - 65%.  · Left ventricular diastolic function was normal.  · No aortic valve regurgitation or stenosis is present  · Trace mitral valve regurgitation is present.  · Trace to mild tricuspid valve regurgitation is present. RVSP- 18 mmHg  · No evidence of a patent foramen ovale. Saline test results are negative      Holter monitor 4/23/2021    · Patient was monitored for almost 7 days.  · Baseline rhythm sinus with average heart rate of 82 bpm  · Rare PAC and PVC seen  · Patient had few runs of sinus tachycardia  · No SVT or VT noted  · No pauses seen  · No symptoms recorded.    · Patient was monitored for almost 7 days.  · Baseline rhythm sinus with average heart rate of 82 bpm  · Rare PAC and PVC seen  · Patient had few runs of sinus tachycardia  · No SVT or VT noted  · No pauses seen  · No symptoms recorded.    4/23/2021 EKG NSR        Neuro/Psych  (+) psychiatric history ADHD, Anxiety and Depression  GI/Hepatic/Renal/Endo - negative ROS     Musculoskeletal     Abdominal  - normal exam   Substance History      OB/GYN          Other                      Anesthesia Plan    ASA 2     MAC   total IV anesthesia  (Risks and benefits discussed including risk of aspiration, recall and dental damage. All patient questions answered.    Will continue with plan of care.)  intravenous  induction     Anesthetic plan, risks, benefits, and alternatives have been provided, discussed and informed consent has been obtained with: patient.  Pre-procedure education provided    CODE STATUS:

## 2025-02-04 NOTE — PROGRESS NOTES
Subjective   Ivett Patrick is a 24 y.o. female.   Chief Complaint   Patient presents with    Post-op Follow-up     Breast bx        History of Present Illness The patient is in the office today for a post up visit following a left breast excisional biopsy. The patient was seen in the office for her most recent follow-up ultrasound evaluation, 3.6 cm in size and was noted to have some vascular flow. The pathology report was received and showed  .     The following portions of the patient's history were reviewed and updated as appropriate: allergies, current medications, past family history, past medical history, past social history, past surgical history, and problem list.        Objective   Physical Exam      Assessment & Plan   Diagnoses and all orders for this visit:    1. Mass of upper outer quadrant of left breast (Primary)

## 2025-02-04 NOTE — ANESTHESIA POSTPROCEDURE EVALUATION
Patient: Ivett Patrick    Procedure Summary       Date: 02/03/25 Room / Location: [facility] OR  /  [facility] OR    Anesthesia Start: 1424 Anesthesia Stop: 1521    Procedure: BREAST BIOPSY (Left: Breast) Diagnosis:       Mass of left breast, unspecified quadrant      (Mass of left breast, unspecified quadrant [N63.20])    Surgeons: Allison Richards MD Provider: Akanksha Hernandez CRNA    Anesthesia Type: MAC ASA Status: 2            Anesthesia Type: MAC    Vitals  Vitals Value Taken Time   /78 02/03/25 1555   Temp 97.5 °F (36.4 °C) 02/03/25 1525   Pulse 65 02/03/25 1555   Resp 20 02/03/25 1555   SpO2 100 % 02/03/25 1555           Post Anesthesia Care and Evaluation    Patient location during evaluation: bedside  Patient participation: complete - patient participated  Level of consciousness: awake  Pain score: 0  Pain management: adequate    Airway patency: patent  Anesthetic complications: No anesthetic complications  PONV Status: controlled  Cardiovascular status: acceptable and stable  Respiratory status: acceptable and room air  Hydration status: acceptable    Comments: Vital signs as noted in nursing documentation as per protocol

## 2025-02-05 ENCOUNTER — TELEMEDICINE (OUTPATIENT)
Age: 25
End: 2025-02-05
Payer: COMMERCIAL

## 2025-02-05 DIAGNOSIS — F41.1 GENERALIZED ANXIETY DISORDER: ICD-10-CM

## 2025-02-05 DIAGNOSIS — F90.0 ADHD (ATTENTION DEFICIT HYPERACTIVITY DISORDER), INATTENTIVE TYPE: Primary | ICD-10-CM

## 2025-02-05 NOTE — PROGRESS NOTES
"     Telehealth E-Visit      Patient Name: Ivett Patrick  : 2000   MRN: [id number]     Chief Complaint:  Ivett Patrick is a 24 y.o. female who is here today to follow up with medication management for ADHD and anxiety.    I have reviewed the E-Visit questionnaire and the patient's answers, my assessment and plan are listed below.     Mode of Visit: Video  Location of patient: -HOME-  Location of provider: [facility]+  You have chosen to receive care through a telehealth visit.  The patient has signed the video visit consent form.  The visit included audio and video interaction. No technical issues occurred during this visit.    This provider is located at the [facility] (through [facility]), [address] [place] [address] using a secure [employer] Video Visit through [employer]. Patient is being seen remotely via telehealth from a secure environment in [place]. The patient's condition being diagnosed/treated is appropriate for telemedicine. The provider identified herself as well as her credentials. The patient, and/or patients guardian, consent to be seen remotely, and when consent is given they understand that the consent allows for patient identifiable information to be sent to a third party as needed. They may refuse to be seen remotely at any time. The electronic data is encrypted and password protected, and the patient and/or guardian has been advised of the potential risks to privacy not withstanding such measures.    You have chosen to receive care through a telehealth visit. Do you consent to use a video/audio connection for your medical care today? Yes    History of Present Illness: Patient reports that she is home today, recovering from a surgical procedure. She has had several stressors lately, and reports finding it difficult to remember to take her Pristiq nightly. She states, \"When I remember to take it, I think it works.\" She has previously responded well to " Vyvanse, but could not continue the medication due to cost. She has been taking Adderall XR 20 mg for several days, and reports that she thinks the effect does not last very long. She reports not taking the Buspar or prazosin, due to perceived inefficacy. No SI/HI/psychotic/manic symptoms present, no adverse effects or side effects to current medications noted, and no issues with appetite or sleep reported.    Subjective      Review of Systems:   Review of Systems   Psychiatric/Behavioral:  Positive for decreased concentration, suicidal ideas, depressed mood and stress.        Medications:     Current Outpatient Medications:     amphetamine-dextroamphetamine XR (ADDERALL XR) 20 MG 24 hr capsule, Take 1 capsule by mouth Daily, Disp: 30 capsule, Rfl: 0    desvenlafaxine (PRISTIQ) 50 MG 24 hr tablet, Take 1 tablet by mouth Daily., Disp: 30 tablet, Rfl: 2    Drospirenone-Estetrol (Nextstellis) 3-14.2 MG tablet, Take 1 tablet by mouth Daily., Disp: 84 tablet, Rfl: 3    Drospirenone-Estetrol (Nextstellis) 3-14.2 MG tablet, Take 1 tablet by mouth once daily, Disp: 28 tablet, Rfl: 4    Drospirenone-Estetrol (Nextstellis) 3-14.2 MG tablet, Take 1 tablet by mouth once daily, Disp: 84 tablet, Rfl: 4    HYDROcodone-acetaminophen (NORCO) 7.5-325 MG per tablet, Take 1 tablet by mouth Every 4 (Four) Hours As Needed for Moderate Pain (Pain)., Disp: 10 tablet, Rfl: 0    linaclotide (Linzess) 290 MCG capsule capsule, Take 1 capsule by mouth Every Morning Before Breakfast., Disp: 30 capsule, Rfl: 11    meclizine (ANTIVERT) 25 MG tablet, Take 1 tablet by mouth 3 (Three) Times a Day As Needed for Dizziness., Disp: 20 tablet, Rfl: 0    ondansetron ODT (ZOFRAN-ODT) 4 MG disintegrating tablet, Place 1 tablet on the tongue Every 6 (Six) Hours As Needed for Nausea or Vomiting., Disp: 20 tablet, Rfl: 0    Allergies:   Allergies   Allergen Reactions    Latex Hives    Penicillins Nausea And Vomiting     Beta lactam allergy details  Antibiotic  reaction: unknown  Age at reaction: infant  Dose to reaction time: unknown  Reason for antibiotic: unknown  Epinephrine required for reaction?: unknown  Tolerated antibiotics: unknown         Amoxicillin Nausea And Vomiting and Rash     rash       Results Reviewed: n/a     The following portion of the patient's history were reviewed and updated appropriately: allergies, current and past medications, family history, medical history and social history.    Objective     Mental Status Exam:  MENTAL STATUS EXAM   General Appearance:  Cleanly groomed and dressed  Eye Contact:  Good eye contact  Attitude:  Cooperative  Motor Activity:  Normal gait, posture and fidgety  Muscle Strength:  Normal  Speech:  Normal rate, tone, volume  Language:  Spontaneous  Mood and affect:  Normal, pleasant, depressed and anxious  Hopelessness:  Denies  Loneliness: Denies  Thought Process:  Logical  Associations/ Thought Content:  No delusions  Hallucinations:  None  Suicidal Ideations:  Not present  Homicidal Ideation:  Not present  Sensorium:  Alert and clear  Orientation:  Person  Immediate Recall, Recent, and Remote Memory:  Intact  Attention Span/ Concentration:  Easily distracted  Fund of Knowledge:  Appropriate for age and educational level  Intellectual Functioning:  Average range  Insight:  Good  Judgement:  Good  Reliability:  Good  Impulse Control:  Good      SUICIDE RISK ASSESSMENT/CSSRS:  1. Does patient have thoughts of suicide? no  2. Does patient have intent for suicide? no  3. Does patient have a current plan for suicide? no  4. History of suicide attempts: no  5. Family history of suicide or attempts: no  6. History of violent behaviors towards others or property or thoughts of committing suicide: no  7. History of sexual aggression toward others: no  8. Access to firearms or weapons: no    Labs Reviewed: n/a  UDS Reviewed: 8/12/24, results as expected  Chart since last visit reviewed: yes    Assessment / Plan    Quality  Measures:  Tobacco Cessation: Patient denies tobacco use. No tobacco cessation education necessary.    Depression (PHQ >9): Patient screened positive for depression. Follow up recommendations include medication management, suicide risk assessment, continued screening score monitoring and supportive care.    Medication education:   Benzo: n/a  Stimulants: CSA up to date and on file     Risk Assessment: Risk of self-harm acutely is low. Risk of self-harm chronically is also low, but could be further elevated in the event of treatment noncompliance and/or AODA.    20 minutes were spent reviewing the patient's questionnaire, formulating a treatment plan, and relaying information to the patient via Lat49.    Assessment/Plan:    Diagnoses and plan for this visit:  ADHD, inattentive type: Continue Adderall XR 20 mg daily. At next refill, increase to 25 mg daily.  No prescriptions sent today.  Generalized Anxiety Disorder: Continue Pristiq 50 mg nightly. No prescription sent today.    Impression/Formulation:  Patient appeared alert and oriented.  Patient is voluntarily contiuing psychiatric care at [facility].  Patient is receptive to assistance with maintaining a stable lifestyle.  Patient presents with history of     ICD-10-CM ICD-9-CM   1. ADHD (attention deficit hyperactivity disorder), inattentive type  F90.0 314.00   2. Generalized anxiety disorder  F41.1 300.02   .     Treatment Plan:   Continue Pristiq  50 mg nightly. At next refill, increase Adderall XR to 25 mg. Discontinue Buspar and Prazosin (patient reports not taking them).     Patient will continue supportive psychotherapy efforts and medications as indicated. Discussed medication options and treatment plan of prescribed medication(s) as well as the risks, benefits, and potential side effects. Patient ackowledged and verbally consented to continue with current treatment plan and was educated on the importance of compliance with  treatment and follow-up appointments. Patient seems reasonably able to adhere to treatment plan.      Assisted Patient in identifying risk factors which would indicate the need for higher level of care including thoughts to harm self or others and/or self-harming behavior and encouraged Patient to contact this office, call 911, or present to the nearest emergency room should any of these events occur. Discussed crisis intervention services and means to access. Clinic will obtain release of information for current treatment team for continuity of care as needed. Patient adamantly and convincingly denies current suicidal or homicidal ideation or perceptual disturbance.       Follow Up:   Return in about 6 weeks (around 3/19/2025) for Medication Management.        JAVIER Do  [facility]    This is electronically signed by JAVIER Do  02/05/2025 16:51 EST

## 2025-02-06 LAB — REF LAB TEST METHOD: NORMAL

## 2025-02-11 ENCOUNTER — OFFICE VISIT (OUTPATIENT)
Dept: SURGERY | Facility: CLINIC | Age: 25
End: 2025-02-11
Payer: COMMERCIAL

## 2025-02-11 VITALS
HEIGHT: 60 IN | OXYGEN SATURATION: 100 % | WEIGHT: 153.4 LBS | DIASTOLIC BLOOD PRESSURE: 74 MMHG | SYSTOLIC BLOOD PRESSURE: 116 MMHG | BODY MASS INDEX: 30.12 KG/M2 | TEMPERATURE: 97.8 F | HEART RATE: 71 BPM

## 2025-02-11 DIAGNOSIS — N63.21 MASS OF UPPER OUTER QUADRANT OF LEFT BREAST: Primary | ICD-10-CM

## 2025-02-11 PROCEDURE — 99024 POSTOP FOLLOW-UP VISIT: CPT | Performed by: SURGERY

## 2025-02-11 RX ORDER — HYDROCORTISONE 25 MG/G
CREAM TOPICAL
COMMUNITY
Start: 2024-11-19

## 2025-02-20 ENCOUNTER — TELEPHONE (OUTPATIENT)
Dept: FAMILY MEDICINE CLINIC | Facility: CLINIC | Age: 25
End: 2025-02-20
Payer: COMMERCIAL

## 2025-02-20 DIAGNOSIS — J10.1 INFLUENZA A: Primary | ICD-10-CM

## 2025-02-20 RX ORDER — OSELTAMIVIR PHOSPHATE 75 MG/1
75 CAPSULE ORAL 2 TIMES DAILY
Qty: 10 CAPSULE | Refills: 0 | Status: SHIPPED | OUTPATIENT
Start: 2025-02-20

## 2025-02-20 NOTE — TELEPHONE ENCOUNTER
Called patient to see how she was feeling. She is still feeling really bad. Nothing she is taking has help[ed with her head ache or body aches.She also states if she goes longer than 3 hours she spikes a fever again. She is alternating between Tylenol and Ibuprofen.   She is ok with you sending in for Tamiflu to [facility]

## 2025-02-21 NOTE — OP NOTE
Operative Report    Patient Name:  Ivett Patrick  YOB: 2000    Date of Surgery:  2/3/2025     Pre-op Diagnosis:   Mass of left breast, unspecified quadrant [N63.20]    Post-Op Diagnosis:     * Mass of left breast, unspecified quadrant [N63.20]     Procedure(s):  BREAST BIOPSY         Staff:  Surgeon(s):  Allison Richards MD         Anesthesia: Monitored Anesthesia Care    Estimated Blood Loss:  5mL    Implants:    Nothing was implanted during the procedure    Specimen:          Specimens       ID Source Type Tests Collected By Collected At Frozen?    A Breast, Left Tissue TISSUE EXAM, [facility] ([facility], [facility])   Karsten Santoro RN 2/3/25 0926 No    Description: LEFT BREAST BIOPSY            Complications: none    Indications:  Ms. Patrick is a 24-year-old female patient with enlarging palpable solid mass of the left breast at the 1 to 2 o'clock position as seen on recent ultrasound. This is approaching 4 cm in diameter, and surgical excision is certainly indicated. Although this most likely represents a benign fibroadenoma, there is certainly benefit to definitive pathologic evaluation with excision. I discussed this in detail with the patient, and answered her questions related to the procedure. We discussed the risks, benefits, and alternatives to the proposed surgical procedure, including the risk of recurrence. She understands, and wishes to move forward with an excisional breast biopsy as recommended.     Description of Procedure: The patient was seen and examined in the preoperative holding area on the day of the scheduled surgical procedure.  Her history and physical examination was updated as appropriate.  The surgical site was marked.  She received appropriate preoperative antibiotics, and subcutaneous heparin for DVT prophylaxis.  She was then taken to the operating room placed upon the operating table, where a timeout was performed using the WHO checklist.  Following satisfactory induction of  anesthesia, the patient's left breast and chest were prepped and draped in the usual sterile fashion.  The palpable mass was reidentified, and the skin overlying about the mass, and at the edge of the areola were anesthetized preemptively with infiltration of lidocaine.  A circumareolar incision was made, and this was deepened through the full-thickness of the skin into the soft tissue, which was further divided with cautery.  Retractors were placed, and a flap was raised extending towards the upper outer quadrant of the breast with a palpable mass was located.  A combination of blunt dissection and electrocautery was used to circumferentially separate the mass from the adjacent breast tissue.  The mass was eventually able to be delivered out of the wound after which his posterior attachments were divided with cautery.  The entire mass was excised.  The mass itself was solid, firm, and white in color consistent with a fibroadenoma. It measured 4.5 x 3.5 cm in size.  This was subsequently passed off the field as specimen.  The biopsy cavity was inspected, and hemostasis was ensured with cautery.  Once this was done, the breast tissue was reapproximated with interrupted 2-0 Vicryl suture.  The more superficial subcutaneous tissue was reapproximated with interrupted 3-0 Vicryl suture.  The skin was closed with a running, subcuticular 5-0 PDS.  Additional 0.25% Marcaine was infiltrated around the wound for postoperative analgesia.  Mastisol and Steri-Strips were applied followed by dry sterile dressing.  The patient was then awakened from anesthesia, and taken to the recovery room in good condition.  There were no complications, and the procedure was well-tolerated by the patient.  At the conclusion, all sponge, needle, and instrument counts were correct.       Allison Richards MD     Date: 2/21/2025  Time: 15:17 EST

## 2025-03-13 ENCOUNTER — OFFICE VISIT (OUTPATIENT)
Dept: FAMILY MEDICINE CLINIC | Facility: CLINIC | Age: 25
End: 2025-03-13
Payer: COMMERCIAL

## 2025-03-13 VITALS
OXYGEN SATURATION: 99 % | HEIGHT: 60 IN | BODY MASS INDEX: 28.23 KG/M2 | SYSTOLIC BLOOD PRESSURE: 114 MMHG | TEMPERATURE: 98.3 F | RESPIRATION RATE: 16 BRPM | WEIGHT: 143.8 LBS | DIASTOLIC BLOOD PRESSURE: 80 MMHG | HEART RATE: 88 BPM

## 2025-03-13 DIAGNOSIS — F41.1 GENERALIZED ANXIETY DISORDER: ICD-10-CM

## 2025-03-13 DIAGNOSIS — Z00.00 WELL ADULT EXAM: Primary | ICD-10-CM

## 2025-03-13 DIAGNOSIS — Z23 NEED FOR HEPATITIS B VACCINATION: ICD-10-CM

## 2025-03-13 DIAGNOSIS — F90.0 ADHD (ATTENTION DEFICIT HYPERACTIVITY DISORDER), INATTENTIVE TYPE: ICD-10-CM

## 2025-03-13 RX ORDER — LISDEXAMFETAMINE DIMESYLATE 40 MG/1
40 CAPSULE ORAL EVERY MORNING
Qty: 30 CAPSULE | Refills: 0 | Status: SHIPPED | OUTPATIENT
Start: 2025-03-13

## 2025-03-13 RX ORDER — VILAZODONE HYDROCHLORIDE 20 MG/1
20 TABLET ORAL DAILY
Qty: 90 TABLET | Refills: 3 | Status: SHIPPED | OUTPATIENT
Start: 2025-03-13

## 2025-03-13 NOTE — PROGRESS NOTES
Female Physical Note      Date: 2025   Patient Name: Ivett Patrick  : 2000   MRN: [id number]     Chief Complaint:    Chief Complaint   Patient presents with    Annual Exam       History of Present Illness: Ivett Patrick is a 24 y.o. female who is here today for their annual health maintenance and physical.  She is also returns for follow-up of her chronic medical conditions.    History of Present Illness  The patient is a 24-year-old female who presents for evaluation of ADHD, anxiety, and health maintenance.    She has been on a regimen of Adderall 20 mg daily, which she reports as effective. However, she experiences irritability when the medication wears off and struggles with her schoolwork in the afternoon. She also reports difficulty remembering to take a midday pill. She has 5 pills left. She recalls being prescribed Vyvanse but discontinued it due to insurance issues. She was on 30 mg of Vyvanse. She is currently in the midst of her midterm exams with 8 weeks remaining in the semester.    She discontinued her anxiety medication 4 weeks ago due to forgetfulness and a discussion with Celsa about the possibility of her symptoms being related to ADHD rather than anxiety. Over the past week, she has experienced increased anxiety due to various stressors. She reports obsessive-compulsive tendencies, such as an inability to sleep until completing homework assignments. She has tried Zoloft and Lexapro in the past but did not tolerate them well. She has never tried Trintellix or Viibryd. She was previously on Fetzima, which she found beneficial, but had to discontinue due to cost. She was on Fetzima and Adderall for approximately 3 to 4 months and found this combination effective. She was able to sleep well while on Fetzima but now experiences restlessness and wakes up frequently.    She has received the hepatitis B vaccine series, with the most recent dose administered on 2023. She is uncertain  about the specific vaccine she received upon employment. She has not yet completed her wellness labs. Her last Pap smear was conducted on 11/18/2024 by Deann Webster at [facility], which yielded normal results. She believes she has undergone STD screening but is unsure of the frequency. She reports no current symptoms.    MEDICATIONS  Current: Adderall  Past: Vyvanse, Zoloft, Lexapro, Fetzima    IMMUNIZATIONS  She has had 3 doses of the hepatitis B vaccine. She received the HPV vaccine on 09/28/2023. She received the MMR vaccine on 07/26/2023. She received the chickenpox vaccine on 07/26/2023.     Patient appears to be doing otherwise well.  They have continue with their medications without any side effects.  They have not had any changes in their usual activity, appetite and sleep.  Patient denies any other cardiovascular, respiratory, gastrointestinal, urologic or neurologic complaints.    Subjective      Review of Systems:   Review of Systems   Respiratory:  Negative for shortness of breath.    Cardiovascular:  Negative for chest pain and palpitations.   Gastrointestinal:  Negative for nausea.   Neurological:  Negative for dizziness and confusion.   Psychiatric/Behavioral:  Negative for depressed mood.        Past Medical History, Social History, Family History and Care Team were all reviewed with patient and updated as appropriate.     Medications:     Current Outpatient Medications:     Drospirenone-Estetrol (Nextstellis) 3-14.2 MG tablet, Take 1 tablet by mouth once daily, Disp: 84 tablet, Rfl: 4    linaclotide (Linzess) 290 MCG capsule capsule, Take 1 capsule by mouth Every Morning Before Breakfast., Disp: 30 capsule, Rfl: 11    lisdexamfetamine (Vyvanse) 40 MG capsule, Take 1 capsule by mouth Every Morning, Disp: 30 capsule, Rfl: 0    meclizine (ANTIVERT) 25 MG tablet, Take 1 tablet by mouth 3 (Three) Times a Day As Needed for Dizziness., Disp: 20 tablet, Rfl: 0    Procto-Med HC 2.5 % rectal cream, APPLY CREAM  RECTALLY TWICE DAILY AS DIRECTED, Disp: , Rfl:     vilazodone (VIIBRYD) 20 MG tablet tablet, Take 1 tablet by mouth Daily., Disp: 90 tablet, Rfl: 3    Allergies:   Allergies   Allergen Reactions    Latex Hives    Penicillins Nausea And Vomiting     Beta lactam allergy details  Antibiotic reaction: unknown  Age at reaction: infant  Dose to reaction time: unknown  Reason for antibiotic: unknown  Epinephrine required for reaction?: unknown  Tolerated antibiotics: unknown         Amoxicillin Nausea And Vomiting and Rash     rash       Immunizations:  Health Maintenance Summary            Current Care Gaps       HEPATITIS C SCREENING (Once) Never done     No completion, postpone, or frequency change history exists for this topic.                      Upcoming       COVID-19 Vaccine (2 - 2024-25 season) Postponed until 11/21/2025 11/21/2024  Postponed until 11/21/2025 by Jadyn Crook MA (Patient Refused)    10/21/2021  Imm Admin: COVID-19 (MODERNA) 1st,2nd,3rd Dose Monovalent              CHLAMYDIA SCREENING (Yearly) Next due on 11/18/2025 11/18/2024  Done              BMI FOLLOWUP (Yearly) Next due on 11/21/2025 11/21/2024  Registry Metric: BMI Follow-up              ANNUAL PHYSICAL (Yearly) Next due on 3/13/2026      03/13/2025  Done              PAP SMEAR (Every 3 Years) Next due on 11/18/2027 11/18/2024  Done              TDAP/TD VACCINES (3 - Td or Tdap) Next due on 10/29/2028      10/29/2018  Imm Admin: Tdap    08/13/2012  Imm Admin: Tdap                      Completed or No Longer Recommended       INFLUENZA VACCINE  Completed      11/07/2024  Imm Admin: Fluzone  >6mos    11/08/2023  Imm Admin: Fluzone (or Fluarix & Flulaval for VFC) >6mos    09/20/2017  Imm Admin: Fluzone (or Fluarix & Flulaval for VFC) >6mos    09/29/2015  Imm Admin: Fluzone (or Fluarix & Flulaval for VFC) >6mos    10/28/2014  Imm Admin: Fluzone (or Fluarix & Flulaval for VFC) >6mos     Only the first 5 history entries have  "been loaded, but more history exists.            HPV VACCINES (Series Information) Completed      09/28/2023  Imm Admin: Hpv9    05/12/2023  Imm Admin: Hpv9    03/30/2023  Imm Admin: Hpv9              Pneumococcal Vaccine 0-49 (Series Information) Aged Out      2000  Imm Admin: PEDS-Pneumococcal Conjugate (PCV7)              MENINGOCOCCAL B VACCINE (Series Information) Aged Out      No completion, postpone, or frequency change history exists for this topic.                             No orders of the defined types were placed in this encounter.       Colorectal Screening:   Deferred.  Last Completed Colonoscopy    This patient has no relevant Health Maintenance data.       Pap: Up-to-date.  Last Completed Pap Smear            Upcoming       PAP SMEAR (Every 3 Years) Next due on 11/18/2027 11/18/2024  Done                           Mammogram: Up-to-date.  Last Completed Mammogram    This patient has no relevant Health Maintenance data.          CT for Smoker (Age 50-80, 20 pk yr):   Deferred.  Bone Density/DEXA (Age 65 or high risk): Deferred.  Hep C (Age 18-79 once): Previously ordered.  HIV (Age 15-65 once): No results found for: \"HIV1X2\"  A1c:   Hemoglobin A1C   Date Value Ref Range Status   03/14/2025 5.00 4.80 - 5.60 % Final      Lipid panel:  No results found for: \"LIPIDEXCLUSI\"    The ASCVD Risk score (Fidel DK, et al., 2019) failed to calculate for the following reasons:    The 2019 ASCVD risk score is only valid for ages 40 to 79    Dermatology: Advised if necessary.  Ophthalmologist: Up-to-date.  Dentist: Up-to-date.    Tobacco Use: Low Risk  (3/13/2025)    Patient History     Smoking Tobacco Use: Never     Smokeless Tobacco Use: Never     Passive Exposure: Never       Social History     Substance and Sexual Activity   Alcohol Use No        Social History     Substance and Sexual Activity   Drug Use Never        Diet/Physical activity: Well-balanced diet/daily exercise.    Sexual Health: No " issues at present time.   Menopause: No issues at present time.  Menstrual Cycles: No issues at present time.    Depression: PHQ-2 Depression Screening  PHQ-9 Total Score:  0    Measures:   Advanced Care Planning:   Patient does not have an advance directive, information provided.    Smoking Cessation:   Non-smoker.    Objective     Physical Exam:  Vital Signs: Blood pressure 114/80 heart rate 88 respiratory rate 16 temperature 98.3 oxygen saturation 99% weight 143 pounds height 60 inches BMI 28.1      Physical Exam  Vitals and nursing note reviewed.   Constitutional:       Appearance: Normal appearance.   HENT:      Head: Normocephalic and atraumatic.      Nose: Nose normal.      Mouth/Throat:      Pharynx: Oropharynx is clear.   Eyes:      Extraocular Movements: Extraocular movements intact.      Pupils: Pupils are equal, round, and reactive to light.   Neck:      Thyroid: No thyroid mass or thyromegaly.      Trachea: Trachea normal.   Cardiovascular:      Rate and Rhythm: Normal rate and regular rhythm.      Pulses: Normal pulses. No decreased pulses.      Heart sounds: Normal heart sounds.   Pulmonary:      Effort: Pulmonary effort is normal.      Breath sounds: Normal breath sounds.   Abdominal:      General: Abdomen is flat. Bowel sounds are normal.      Palpations: Abdomen is soft.      Tenderness: There is no abdominal tenderness.   Musculoskeletal:      Cervical back: Neck supple.      Right lower leg: No edema.      Left lower leg: No edema.   Lymphadenopathy:      Cervical: No cervical adenopathy.   Skin:     General: Skin is warm and dry.   Neurological:      General: No focal deficit present.      Mental Status: She is alert and oriented to person, place, and time.      Sensory: Sensation is intact.      Motor: Motor function is intact.      Coordination: Coordination is intact.   Psychiatric:         Attention and Perception: Attention normal.         Mood and Affect: Mood normal.         Speech:  Speech normal.         Behavior: Behavior normal.              Procedures    Assessment / Plan      Assessment/Plan:   Diagnoses and all orders for this visit:    1. Well adult exam (Primary)  Patient did have a wellness exam performed today that did not reveal any abnormality.  Patient's anxiety/depression scores were reviewed.  We will continue to monitor laboratory data as well as symptomatology and if there are any other questions or concerns prior to the next scheduled follow-up they will contact us.  -     CBC (No Diff); Future  -     Comprehensive Metabolic Panel; Future  -     Hemoglobin A1c; Future  -     Lipid Panel; Future  -     Urinalysis without microscopic (no culture) - Urine, Clean Catch; Future  -     Vitamin B12; Future  -     TSH Rfx On Abnormal To Free T4; Future    2. ADHD (attention deficit hyperactivity disorder), inattentive type  Patient is doing very well with respect to their ADHD.  They are tolerating their medications well without any side effects.  They have not had any complaints of insomnia, agitation, anxiety, tachycardia excetra.  They understands the risk and benefits of medications and believe that things are doing well and does not wish to make any changes currently.  We will follow-up in 3 months time or sooner if there is any other questions or concerns.  Patient has had some difficulty with agitation toward the end of the day most likely secondary to the weaning off of the ADHD medications.  We have discussed options and will pursue with changing the ADHD medication over the Vyvanse.  Hopefully this will help give enough medication for the evening while she is taking college classes and to prevent any emotionality.  If she has difficulty she will contact us.  -     lisdexamfetamine (Vyvanse) 40 MG capsule; Take 1 capsule by mouth Every Morning  Dispense: 30 capsule; Refill: 0    3. Generalized anxiety disorder  Patient appears to be doing well at present time with respect to  their anxiety.  They are tolerating their medications and other treatment plans without difficulty.  We will continue with current regimen and if they have any other problems or complaints prior to her next scheduled follow-up they will contact us.  Adjustments of medications or referral to a behavioral health specialist may be necessary in the future.  Patient has used Fetzima in the past with good success.  We have discussed options and will try her on Viibryd.  -     vilazodone (VIIBRYD) 20 MG tablet tablet; Take 1 tablet by mouth Daily.  Dispense: 90 tablet; Refill: 3    4. Need for hepatitis B vaccination   Patient is not immune to hepatitis B.  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  She should get hepatitis B vaccine series.       Healthcare Maintenance:  Counseling provided based on age appropriate USPSTF guidelines.       Ivett Patrick voices understanding and acceptance of this advice and will call back with any further questions or concerns. AVS with preventive healthcare tips printed for patient.     Follow Up:   Return in about 3 months (around 6/13/2025).         At [facility], we believe that sharing information builds trust and better relationships. You are receiving this note because you recently visited [facility]. It is possible you will see health information before a provider has talked with you about it. This kind of information can be easy to misunderstand. To help you fully understand what it means for your health, we urge you to discuss this note with your provider.    Arsh Ramirez MD  [facility]  Answers submitted by the patient for this visit:  Anxiety (Submitted on 3/13/2025)  Chief Complaint: Anxiety  Visit: follow-up  Frequency: most days  Severity: moderate  dry mouth: No  excessive worry: No  insomnia: No  irritability: No  malaise/fatigue: No  obsessions: No  Sleep quality: fair  Hours of sleep per night: 6 Hours  Aggravated by:  nothing  Medication compliance: 51-75%

## 2025-03-14 ENCOUNTER — LAB (OUTPATIENT)
Dept: FAMILY MEDICINE CLINIC | Facility: CLINIC | Age: 25
End: 2025-03-14
Payer: COMMERCIAL

## 2025-03-14 DIAGNOSIS — Z00.00 WELL ADULT EXAM: ICD-10-CM

## 2025-03-14 LAB
BILIRUB UR QL STRIP: NEGATIVE
CLARITY UR: CLEAR
COLOR UR: YELLOW
DEPRECATED RDW RBC AUTO: 42.9 FL (ref 37–54)
ERYTHROCYTE [DISTWIDTH] IN BLOOD BY AUTOMATED COUNT: 12.1 % (ref 12.3–15.4)
GLUCOSE UR STRIP-MCNC: NEGATIVE MG/DL
HBA1C MFR BLD: 5 % (ref 4.8–5.6)
HCT VFR BLD AUTO: 47.3 % (ref 34–46.6)
HGB BLD-MCNC: 15 G/DL (ref 12–15.9)
HGB UR QL STRIP.AUTO: NEGATIVE
KETONES UR QL STRIP: ABNORMAL
LEUKOCYTE ESTERASE UR QL STRIP.AUTO: NEGATIVE
MCH RBC QN AUTO: 29.8 PG (ref 26.6–33)
MCHC RBC AUTO-ENTMCNC: 31.7 G/DL (ref 31.5–35.7)
MCV RBC AUTO: 94 FL (ref 79–97)
NITRITE UR QL STRIP: NEGATIVE
PH UR STRIP.AUTO: 6.5 [PH] (ref 5–8)
PLATELET # BLD AUTO: 301 10*3/MM3 (ref 140–450)
PMV BLD AUTO: 11.7 FL (ref 6–12)
PROT UR QL STRIP: NEGATIVE
RBC # BLD AUTO: 5.03 10*6/MM3 (ref 3.77–5.28)
SP GR UR STRIP: 1.02 (ref 1–1.03)
UROBILINOGEN UR QL STRIP: ABNORMAL
VIT B12 BLD-MCNC: 425 PG/ML (ref 211–946)
WBC NRBC COR # BLD AUTO: 5.37 10*3/MM3 (ref 3.4–10.8)

## 2025-03-14 PROCEDURE — 80061 LIPID PANEL: CPT | Performed by: FAMILY MEDICINE

## 2025-03-14 PROCEDURE — 83036 HEMOGLOBIN GLYCOSYLATED A1C: CPT | Performed by: FAMILY MEDICINE

## 2025-03-14 PROCEDURE — 80050 GENERAL HEALTH PANEL: CPT | Performed by: FAMILY MEDICINE

## 2025-03-14 PROCEDURE — 82607 VITAMIN B-12: CPT | Performed by: FAMILY MEDICINE

## 2025-03-14 PROCEDURE — 81003 URINALYSIS AUTO W/O SCOPE: CPT | Performed by: FAMILY MEDICINE

## 2025-03-15 LAB
ALBUMIN SERPL-MCNC: 4.7 G/DL (ref 3.5–5.2)
ALBUMIN/GLOB SERPL: 1.5 G/DL
ALP SERPL-CCNC: 80 U/L (ref 39–117)
ALT SERPL W P-5'-P-CCNC: 14 U/L (ref 1–33)
ANION GAP SERPL CALCULATED.3IONS-SCNC: 13.9 MMOL/L (ref 5–15)
AST SERPL-CCNC: 20 U/L (ref 1–32)
BILIRUB SERPL-MCNC: 0.4 MG/DL (ref 0–1.2)
BUN SERPL-MCNC: 7 MG/DL (ref 6–20)
BUN/CREAT SERPL: 8.3 (ref 7–25)
CALCIUM SPEC-SCNC: 9.8 MG/DL (ref 8.6–10.5)
CHLORIDE SERPL-SCNC: 105 MMOL/L (ref 98–107)
CHOLEST SERPL-MCNC: 180 MG/DL (ref 0–200)
CO2 SERPL-SCNC: 23.1 MMOL/L (ref 22–29)
CREAT SERPL-MCNC: 0.84 MG/DL (ref 0.57–1)
EGFRCR SERPLBLD CKD-EPI 2021: 99.7 ML/MIN/1.73
GLOBULIN UR ELPH-MCNC: 3.2 GM/DL
GLUCOSE SERPL-MCNC: 80 MG/DL (ref 65–99)
HDLC SERPL-MCNC: 46 MG/DL (ref 40–60)
LDLC SERPL CALC-MCNC: 115 MG/DL (ref 0–100)
LDLC/HDLC SERPL: 2.45 {RATIO}
POTASSIUM SERPL-SCNC: 4 MMOL/L (ref 3.5–5.2)
PROT SERPL-MCNC: 7.9 G/DL (ref 6–8.5)
SODIUM SERPL-SCNC: 142 MMOL/L (ref 136–145)
TRIGL SERPL-MCNC: 107 MG/DL (ref 0–150)
TSH SERPL DL<=0.05 MIU/L-ACNC: 1.3 UIU/ML (ref 0.27–4.2)
VLDLC SERPL-MCNC: 19 MG/DL (ref 5–40)

## 2025-04-15 DIAGNOSIS — F90.0 ADHD (ATTENTION DEFICIT HYPERACTIVITY DISORDER), INATTENTIVE TYPE: ICD-10-CM

## 2025-04-15 RX ORDER — LISDEXAMFETAMINE DIMESYLATE 60 MG/1
60 CAPSULE ORAL EVERY MORNING
Qty: 30 CAPSULE | Refills: 0 | Status: SHIPPED | OUTPATIENT
Start: 2025-04-15

## 2025-05-06 RX ORDER — KETOCONAZOLE 20 MG/ML
SHAMPOO, SUSPENSION TOPICAL 2 TIMES WEEKLY
Qty: 120 ML | Refills: 11 | Status: SHIPPED | OUTPATIENT
Start: 2025-05-08

## 2025-05-15 DIAGNOSIS — F90.0 ADHD (ATTENTION DEFICIT HYPERACTIVITY DISORDER), INATTENTIVE TYPE: ICD-10-CM

## 2025-05-15 RX ORDER — LISDEXAMFETAMINE DIMESYLATE 60 MG/1
60 CAPSULE ORAL EVERY MORNING
Qty: 30 CAPSULE | Refills: 0 | Status: SHIPPED | OUTPATIENT
Start: 2025-05-15

## 2025-05-15 NOTE — TELEPHONE ENCOUNTER
Rx Refill Note  Requested Prescriptions     Pending Prescriptions Disp Refills    lisdexamfetamine (Vyvanse) 60 MG capsule 30 capsule 0     Sig: Take 1 capsule by mouth Every Morning      Last office visit with prescribing clinician: 3/13/2025   Last telemedicine visit with prescribing clinician: Visit date not found   Next office visit with prescribing clinician: 6/16/2025                         Would you like a call back once the refill request has been completed: [] Yes [] No    If the office needs to give you a call back, can they leave a voicemail: [] Yes [] No    Jadyn Crook MA  05/15/25, 11:43 EDT

## 2025-06-16 ENCOUNTER — OFFICE VISIT (OUTPATIENT)
Dept: FAMILY MEDICINE CLINIC | Facility: CLINIC | Age: 25
End: 2025-06-16
Payer: COMMERCIAL

## 2025-06-16 VITALS
WEIGHT: 146 LBS | OXYGEN SATURATION: 98 % | DIASTOLIC BLOOD PRESSURE: 70 MMHG | HEART RATE: 80 BPM | TEMPERATURE: 98 F | RESPIRATION RATE: 16 BRPM | HEIGHT: 60 IN | BODY MASS INDEX: 28.66 KG/M2 | SYSTOLIC BLOOD PRESSURE: 104 MMHG

## 2025-06-16 DIAGNOSIS — N92.1 MENORRHAGIA WITH IRREGULAR CYCLE: ICD-10-CM

## 2025-06-16 DIAGNOSIS — K59.04 CHRONIC IDIOPATHIC CONSTIPATION: ICD-10-CM

## 2025-06-16 DIAGNOSIS — Z23 NEED FOR HEPATITIS B VACCINATION: ICD-10-CM

## 2025-06-16 DIAGNOSIS — F41.1 GENERALIZED ANXIETY DISORDER: ICD-10-CM

## 2025-06-16 DIAGNOSIS — F90.0 ADHD (ATTENTION DEFICIT HYPERACTIVITY DISORDER), INATTENTIVE TYPE: Primary | ICD-10-CM

## 2025-06-16 PROCEDURE — 90746 HEPB VACCINE 3 DOSE ADULT IM: CPT | Performed by: FAMILY MEDICINE

## 2025-06-16 PROCEDURE — 90471 IMMUNIZATION ADMIN: CPT | Performed by: FAMILY MEDICINE

## 2025-06-16 PROCEDURE — 99214 OFFICE O/P EST MOD 30 MIN: CPT | Performed by: FAMILY MEDICINE

## 2025-06-16 RX ORDER — VILAZODONE HYDROCHLORIDE 40 MG/1
40 TABLET ORAL DAILY
Start: 2025-06-16 | End: 2025-06-23 | Stop reason: SDUPTHER

## 2025-06-16 NOTE — PROGRESS NOTES
Follow Up Office Visit      Date: 2025   Patient Name: Ivett Patrick  : 2000   MRN: [id number]     Chief Complaint:    Chief Complaint   Patient presents with    Follow-up    ADHD       History of Present Illness: Ivett Patrick is a 24 y.o. female who is here today for follow-up.    History of Present Illness  The patient is a 24-year-old female who presents for ADHD, anxiety, depression, and skin discoloration.    She reports satisfactory management of her ADHD symptoms with Vyvanse 60 mg, which she tolerates well without any adverse effects such as palpitations or sleep disturbances. She continues to take Vyvanse.    She has been unable to obtain Fetzima due to high co-pay costs and has discontinued its use, resulting in the resolution of her constipation. She has previously tried fluoxetine, which she found beneficial, but discontinued it after a year due to cost issues. She has also tried Zoloft and Lexapro, both of which were ineffective. She has not tried Trintellix. She has previously tried Pristiq, which was ineffective. She has some Viibryd left and does not need a refill. She is currently on Viibryd, which she finds helpful for her anxiety, although it does not completely alleviate her symptoms. She has noticed an increase in anxiety on days when she does not take Viibryd. She has previously tried BuSpar, which caused nightmares.    She is currently on birth control pills, which she takes every three months, and reports no issues. She has no facial hair growth or acne. She has a history of heavy menstrual bleeding, which was investigated with multiple ultrasounds during her pregnancy. She experienced prolonged bleeding postpartum, which was also evaluated with an ultrasound. She has a tilted cervix and has tried various contraceptive methods, including the NuvaRing, which was unsuccessful. 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    She reports darkening of the skin under her arms and where her pants sit, but no itching. She does not tan and has no history of acne.    She is going to get the hepatitis B vaccine.    SOCIAL HISTORY  She drinks a lot of caffeine and does not drink water.     Patient appears to be doing otherwise well.  They have continue with their medications without any side effects.  They have not had any changes in their usual activity, appetite and sleep.  Patient denies any other cardiovascular, respiratory, gastrointestinal, urologic or neurologic complaints.    Subjective      Review of Systems:   Review of Systems   Constitutional:  Negative for activity change, appetite change, chills, diaphoresis, fatigue and fever.   HENT:  Negative for congestion, sore throat and swollen glands.    Respiratory:  Negative for cough, chest tightness, shortness of breath and wheezing.    Cardiovascular:  Negative for chest pain, palpitations and leg swelling.   Gastrointestinal:  Negative for abdominal distention, abdominal pain, blood in stool, constipation, diarrhea, nausea, vomiting, GERD and indigestion.   Genitourinary:  Negative for difficulty urinating, dysuria, flank pain, frequency, hematuria and urgency.   Musculoskeletal:  Negative for arthralgias, back pain, gait problem, joint swelling, myalgias and neck pain.   Skin:  Negative for rash.   Neurological:  Negative for dizziness, tremors, seizures, syncope, weakness, light-headedness, numbness, headache and memory problem.   Psychiatric/Behavioral:  Negative for sleep disturbance and depressed mood. The patient is not nervous/anxious.        I have reviewed the patients family history, social history, past medical history, past surgical history and have updated it as appropriate.     Medications:     Current Outpatient Medications:     Drospirenone-Estetrol (Nextstellis) 3-14.2 MG tablet, Take 1 tablet by mouth once daily, Disp: 84 tablet, Rfl: 4    ketoconazole  "(NIZORAL) 2 % shampoo, Apply  topically to the appropriate area as directed 2 (Two) Times a Week., Disp: 120 mL, Rfl: 11    linaclotide (Linzess) 290 MCG capsule capsule, Take 1 capsule by mouth Every Morning Before Breakfast., Disp: 30 capsule, Rfl: 11    lisdexamfetamine (Vyvanse) 60 MG capsule, Take 1 capsule by mouth Every Morning, Disp: 30 capsule, Rfl: 0    vilazodone (VIIBRYD) 40 MG tablet tablet, Take 1 tablet by mouth Daily., Disp: , Rfl:     Allergies:   Allergies   Allergen Reactions    Latex Hives    Penicillins Nausea And Vomiting     Beta lactam allergy details  Antibiotic reaction: unknown  Age at reaction: infant  Dose to reaction time: unknown  Reason for antibiotic: unknown  Epinephrine required for reaction?: unknown  Tolerated antibiotics: unknown         Amoxicillin Nausea And Vomiting and Rash     rash       Immunizations:   Immunization History   Administered Date(s) Administered    COVID-19 (MODERNA) 1st,2nd,3rd Dose Monovalent 10/21/2021    DTaP, Unspecified 2000, 02/22/2001, 07/17/2001, 01/29/2002, 05/24/2005    Fluzone  >6mos 11/07/2024    Fluzone (or Fluarix & Flulaval for VFC) >6mos 10/28/2014, 09/29/2015, 09/20/2017, 11/08/2023    Hep B / HiB 2000, 01/29/2002    Hep B, Adolescent or Pediatric 2000, 2000, 01/29/2002    Hib (PRP-OMP) 02/22/2001    Hpv9 03/30/2023, 05/12/2023, 09/28/2023    IPV 2000, 02/22/2001, 01/29/2002, 05/24/2005    MMR 01/29/2002, 05/24/2005    MMRV 07/26/2023    Meningococcal, Unspecified 08/13/2012    PEDS-Pneumococcal Conjugate (PCV7) 2000    Tdap 08/13/2012, 10/29/2018    Varicella 05/27/2002, 08/27/2002, 08/13/2012        Objective     Physical Exam: Please see above  Vital Signs:   Vitals:    06/16/25 1627   BP: 104/70   BP Location: Left arm   Patient Position: Sitting   Cuff Size: Adult   Pulse: 80   Resp: 16   Temp: 98 °F (36.7 °C)   TempSrc: Temporal   SpO2: 98%   Weight: 66.2 kg (146 lb)   Height: 152.4 cm (60\")     Body " mass index is 28.51 kg/m².          Physical Exam  Vitals and nursing note reviewed.   Constitutional:       Appearance: Normal appearance.   HENT:      Head: Normocephalic and atraumatic.      Nose: Nose normal.      Mouth/Throat:      Pharynx: Oropharynx is clear.   Eyes:      Extraocular Movements: Extraocular movements intact.      Pupils: Pupils are equal, round, and reactive to light.   Neck:      Thyroid: No thyroid mass or thyromegaly.      Trachea: Trachea normal.   Cardiovascular:      Rate and Rhythm: Normal rate and regular rhythm.      Pulses: Normal pulses. No decreased pulses.      Heart sounds: Normal heart sounds.   Pulmonary:      Effort: Pulmonary effort is normal.      Breath sounds: Normal breath sounds.   Abdominal:      General: Abdomen is flat. Bowel sounds are normal.      Palpations: Abdomen is soft.      Tenderness: There is no abdominal tenderness.   Musculoskeletal:      Cervical back: Neck supple.      Right lower leg: No edema.      Left lower leg: No edema.   Lymphadenopathy:      Cervical: No cervical adenopathy.   Skin:     General: Skin is warm and dry.   Neurological:      General: No focal deficit present.      Mental Status: She is alert and oriented to person, place, and time.      Sensory: Sensation is intact.      Motor: Motor function is intact.      Coordination: Coordination is intact.   Psychiatric:         Attention and Perception: Attention normal.         Mood and Affect: Mood normal.         Speech: Speech normal.         Behavior: Behavior normal.         Procedures    Results:   Labs:   Hemoglobin A1C   Date Value Ref Range Status   03/14/2025 5.00 4.80 - 5.60 % Final     TSH   Date Value Ref Range Status   03/14/2025 1.300 0.270 - 4.200 uIU/mL Final        POCT Results (if applicable):   Results for orders placed or performed in visit on 03/14/25   CBC (No Diff)    Collection Time: 03/14/25  8:17 AM    Specimen: Arm, Right; Blood   Result Value Ref Range    WBC  5.37 3.40 - 10.80 10*3/mm3    RBC 5.03 3.77 - 5.28 10*6/mm3    Hemoglobin 15.0 12.0 - 15.9 g/dL    Hematocrit 47.3 (H) 34.0 - 46.6 %    MCV 94.0 79.0 - 97.0 fL    MCH 29.8 26.6 - 33.0 pg    MCHC 31.7 31.5 - 35.7 g/dL    RDW 12.1 (L) 12.3 - 15.4 %    RDW-SD 42.9 37.0 - 54.0 fl    MPV 11.7 6.0 - 12.0 fL    Platelets 301 140 - 450 10*3/mm3   Comprehensive Metabolic Panel    Collection Time: 03/14/25  8:17 AM    Specimen: Arm, Right; Blood   Result Value Ref Range    Glucose 80 65 - 99 mg/dL    BUN 7 6 - 20 mg/dL    Creatinine 0.84 0.57 - 1.00 mg/dL    Sodium 142 136 - 145 mmol/L    Potassium 4.0 3.5 - 5.2 mmol/L    Chloride 105 98 - 107 mmol/L    CO2 23.1 22.0 - 29.0 mmol/L    Calcium 9.8 8.6 - 10.5 mg/dL    Total Protein 7.9 6.0 - 8.5 g/dL    Albumin 4.7 3.5 - 5.2 g/dL    ALT (SGPT) 14 1 - 33 U/L    AST (SGOT) 20 1 - 32 U/L    Alkaline Phosphatase 80 39 - 117 U/L    Total Bilirubin 0.4 0.0 - 1.2 mg/dL    Globulin 3.2 gm/dL    A/G Ratio 1.5 g/dL    BUN/Creatinine Ratio 8.3 7.0 - 25.0    Anion Gap 13.9 5.0 - 15.0 mmol/L    eGFR 99.7 >60.0 mL/min/1.73   Hemoglobin A1c    Collection Time: 03/14/25  8:17 AM    Specimen: Arm, Right; Blood   Result Value Ref Range    Hemoglobin A1C 5.00 4.80 - 5.60 %   Lipid Panel    Collection Time: 03/14/25  8:17 AM    Specimen: Arm, Right; Blood   Result Value Ref Range    Total Cholesterol 180 0 - 200 mg/dL    Triglycerides 107 0 - 150 mg/dL    HDL Cholesterol 46 40 - 60 mg/dL    LDL Cholesterol  115 (H) 0 - 100 mg/dL    VLDL Cholesterol 19 5 - 40 mg/dL    LDL/HDL Ratio 2.45    Vitamin B12    Collection Time: 03/14/25  8:17 AM    Specimen: Arm, Right; Blood   Result Value Ref Range    Vitamin B-12 425 211 - 946 pg/mL   TSH Rfx On Abnormal To Free T4    Collection Time: 03/14/25  8:17 AM    Specimen: Arm, Right; Blood   Result Value Ref Range    TSH 1.300 0.270 - 4.200 uIU/mL   Urinalysis without microscopic (no culture) - Urine, Clean Catch    Collection Time: 03/14/25  2:34 PM     Specimen: Urine, Clean Catch   Result Value Ref Range    Color, UA Yellow Yellow, Straw    Appearance, UA Clear Clear    pH, UA 6.5 5.0 - 8.0    Specific Gravity, UA 1.023 1.005 - 1.030    Glucose, UA Negative Negative    Ketones, UA Trace (A) Negative    Bilirubin, UA Negative Negative    Blood, UA Negative Negative    Protein, UA Negative Negative    Leuk Esterase, UA Negative Negative    Nitrite, UA Negative Negative    Urobilinogen, UA 1.0 E.U./dL 0.2 - 1.0 E.U./dL       Imaging:   No valid procedures specified.     Measures:   Advanced Care Planning:   Patient does not have an advance directive, information provided.    Smoking Cessation:   Non-smoker.    Assessment / Plan      Assessment/Plan:   Diagnoses and all orders for this visit:    1. ADHD (attention deficit hyperactivity disorder), inattentive type (Primary)   Patient is doing very well with respect to their ADHD.  They are tolerating their medications well without any side effects.  They have not had any complaints of insomnia, agitation, anxiety, tachycardia excetra.  They understands the risk and benefits of medications and believe that things are doing well and does not wish to make any changes currently.  We will follow-up in 3 months time or sooner if there is any other questions or concerns.    2. Generalized anxiety disorder  Patient has tried numerous medications in the past without success.  She has been successful with Prozac previously.  She has done very well with Fetzima but unfortunately her insurance does not cover it.  We have discussed options and will increase her Viibryd.  She has missed doses in the past and could tell that she became more anxious so I am hoping that increasing to 40 mg daily would be beneficial.  We will see how she does with this and if she shows no improvement, we will switch her over to Prozac.  -     vilazodone (VIIBRYD) 40 MG tablet tablet; Take 1 tablet by mouth Daily.    3. Need for hepatitis B  vaccination  Patient's recent  titers did not reveal that she was immune to hepatitis B.  We will give her hepatitis B vaccine.  -     Hepatitis B Vaccine Adult IM    4. Menorrhagia with irregular cycle   Patient doing well at present time.  She is tolerating her oral contraceptives without complaints.  She is taking it on a 3-month schedule.  She will continue to keep follow-up with her gynecologist.    5. Chronic idiopathic constipation   Patient rarely has to take Linzess.  She is doing well at present time with respect to her current regiment.  We will continue with her current treatment and if she has worsening symptoms, further evaluation will be necessary.      Follow Up:   Return in about 3 months (around 9/16/2025).      At [facility], we believe that sharing information builds trust and better relationships. You are receiving this note because you recently visited [facility]. It is possible you will see health information before a provider has talked with you about it. This kind of information can be easy to misunderstand. To help you fully understand what it means for your health, we urge you to discuss this note with your provider.    Arsh Ramirez MD  [facility]    Patient or patient representative verbalized consent for the use of Ambient Listening during the visit with  Arsh Ramirez MD for chart documentation. 6/16/2025  16:30 EDT   Answers submitted by the patient for this visit:  Problem not listed (Submitted on 6/16/2025)  Chief Complaint: Other medical problem  Reason for appointment: Follow  up  anorexia: No  joint pain: No  change in stool: No  headaches: No  joint swelling: No  vertigo: No  visual change: No

## 2025-06-23 DIAGNOSIS — F41.1 GENERALIZED ANXIETY DISORDER: ICD-10-CM

## 2025-06-23 RX ORDER — VILAZODONE HYDROCHLORIDE 40 MG/1
40 TABLET ORAL DAILY
Qty: 90 TABLET | Refills: 3
Start: 2025-06-23

## 2025-06-25 DIAGNOSIS — F90.0 ADHD (ATTENTION DEFICIT HYPERACTIVITY DISORDER), INATTENTIVE TYPE: ICD-10-CM

## 2025-06-25 RX ORDER — LISDEXAMFETAMINE DIMESYLATE 60 MG/1
60 CAPSULE ORAL EVERY MORNING
Qty: 30 CAPSULE | Refills: 0 | Status: CANCELLED | OUTPATIENT
Start: 2025-06-25

## 2025-07-01 DIAGNOSIS — F41.1 GENERALIZED ANXIETY DISORDER: ICD-10-CM

## 2025-07-01 DIAGNOSIS — F90.0 ADHD (ATTENTION DEFICIT HYPERACTIVITY DISORDER), INATTENTIVE TYPE: ICD-10-CM

## 2025-07-01 RX ORDER — LISDEXAMFETAMINE DIMESYLATE 60 MG/1
60 CAPSULE ORAL EVERY MORNING
Qty: 30 CAPSULE | Refills: 0 | Status: CANCELLED | OUTPATIENT
Start: 2025-07-01

## 2025-07-01 RX ORDER — VILAZODONE HYDROCHLORIDE 20 MG/1
20 TABLET ORAL DAILY
Qty: 90 TABLET | Refills: 3 | Status: CANCELLED | OUTPATIENT
Start: 2025-07-01

## 2025-07-01 RX ORDER — VILAZODONE HYDROCHLORIDE 40 MG/1
40 TABLET ORAL DAILY
Qty: 90 TABLET | Refills: 3
Start: 2025-07-01 | End: 2025-07-07 | Stop reason: SDUPTHER

## 2025-07-02 ENCOUNTER — TELEPHONE (OUTPATIENT)
Dept: FAMILY MEDICINE CLINIC | Facility: CLINIC | Age: 25
End: 2025-07-02
Payer: COMMERCIAL

## 2025-07-07 DIAGNOSIS — F41.1 GENERALIZED ANXIETY DISORDER: ICD-10-CM

## 2025-07-07 DIAGNOSIS — F90.0 ADHD (ATTENTION DEFICIT HYPERACTIVITY DISORDER), INATTENTIVE TYPE: ICD-10-CM

## 2025-07-07 RX ORDER — LISDEXAMFETAMINE DIMESYLATE 60 MG/1
60 CAPSULE ORAL EVERY MORNING
Qty: 90 CAPSULE | Refills: 0 | Status: SHIPPED | OUTPATIENT
Start: 2025-07-07

## 2025-07-07 RX ORDER — VILAZODONE HYDROCHLORIDE 40 MG/1
40 TABLET ORAL DAILY
Qty: 90 TABLET | Refills: 3 | Status: SHIPPED | OUTPATIENT
Start: 2025-07-07

## 2025-08-01 ENCOUNTER — CLINICAL SUPPORT (OUTPATIENT)
Dept: FAMILY MEDICINE CLINIC | Facility: CLINIC | Age: 25
End: 2025-08-01
Payer: COMMERCIAL

## 2025-08-01 DIAGNOSIS — Z23 NEED FOR HEPATITIS B VACCINATION: Primary | ICD-10-CM

## 2025-08-01 PROCEDURE — 90471 IMMUNIZATION ADMIN: CPT | Performed by: FAMILY MEDICINE

## 2025-08-01 PROCEDURE — 90746 HEPB VACCINE 3 DOSE ADULT IM: CPT | Performed by: FAMILY MEDICINE

## 2025-08-07 DIAGNOSIS — R50.9 FEVER, UNSPECIFIED FEVER CAUSE: Primary | ICD-10-CM

## 2025-08-12 DIAGNOSIS — F90.0 ADHD (ATTENTION DEFICIT HYPERACTIVITY DISORDER), INATTENTIVE TYPE: ICD-10-CM

## 2025-08-12 RX ORDER — LISDEXAMFETAMINE DIMESYLATE 60 MG/1
60 CAPSULE ORAL EVERY MORNING
Qty: 30 CAPSULE | Refills: 0 | Status: SHIPPED | OUTPATIENT
Start: 2025-08-12

## (undated) DEVICE — GLV SURG ULTRATOUCH BIOGEL/COAT PF LF SZ6 STRL

## (undated) DEVICE — SOL IRR H2O BO 1000ML STRL

## (undated) DEVICE — SLV SCD CALF HEMOFORCE DVT THERP REPROC MD

## (undated) DEVICE — UNDYED MONOFILAMENT (POLYDIOXANONE), ABSORBABLE SYNTHETIC SURGICAL SUTURE: Brand: PDS

## (undated) DEVICE — RICH MAJOR PROCEDURE: Brand: MEDLINE INDUSTRIES, INC.

## (undated) DEVICE — NDL HYPO ECLPS SFTY 25G 1 1/2IN

## (undated) DEVICE — FLEXIBLE YANKAUER,MEDIUM TIP, NO VACUUM CONTROL: Brand: ARGYLE

## (undated) DEVICE — DRSNG WND BORDR/ADHS NONADHR/GZ LF 6X6IN STRL

## (undated) DEVICE — ANTIBACTERIAL UNDYED BRAIDED (POLYGLACTIN 910), SYNTHETIC ABSORBABLE SUTURE: Brand: COATED VICRYL

## (undated) DEVICE — GLV SURG SENSICARE GREEN W/ALOE PF LF 6 STRL

## (undated) DEVICE — ADHS LIQ MASTISOL 2/3ML

## (undated) DEVICE — PATIENT RETURN ELECTRODE, SINGLE-USE, CONTACT QUALITY MONITORING, ADULT, WITH 9FT CORD, FOR PATIENTS WEIGING OVER 33LBS. (15KG): Brand: MEGADYNE

## (undated) DEVICE — SYR LL TP 10ML STRL

## (undated) DEVICE — STRIP,CLOSURE,WOUND,MEDI-STRIP,1/2X4: Brand: MEDLINE

## (undated) DEVICE — DRSNG WND BORDR/ADHS NONADHR/GZ LF 4X4IN STRL

## (undated) DEVICE — SUT SILK 2/0 SH 30IN K833H